# Patient Record
Sex: FEMALE | Race: WHITE | NOT HISPANIC OR LATINO | Employment: OTHER | URBAN - METROPOLITAN AREA
[De-identification: names, ages, dates, MRNs, and addresses within clinical notes are randomized per-mention and may not be internally consistent; named-entity substitution may affect disease eponyms.]

---

## 2017-04-11 ENCOUNTER — OFFICE VISIT (OUTPATIENT)
Dept: NEUROSURGERY | Facility: CLINIC | Age: 72
End: 2017-04-11

## 2017-04-11 VITALS
HEART RATE: 55 BPM | WEIGHT: 120 LBS | DIASTOLIC BLOOD PRESSURE: 75 MMHG | SYSTOLIC BLOOD PRESSURE: 118 MMHG | BODY MASS INDEX: 21.26 KG/M2 | HEIGHT: 63 IN

## 2017-04-11 DIAGNOSIS — M51.17 INTERVERTEBRAL DISC DISORDER WITH RADICULOPATHY OF LUMBOSACRAL REGION: Primary | ICD-10-CM

## 2017-04-11 PROCEDURE — 99213 OFFICE O/P EST LOW 20 MIN: CPT | Performed by: NURSE PRACTITIONER

## 2017-04-11 NOTE — PROGRESS NOTES
Subjective   Patient ID: Aleshia Purvis is a 72 y.o. female is here today for follow-up on back pain after having 3 UYEN's. She states that the first one helped significantly and the last two didn't help as much. She states that her back pain is gradually worsening. She is currently taking Ibuprofen 200 mg PRN for pain. Pain became to recur. She received two more lumbar UYEN in September and November of last year. Pain returned again 4 weeks ago. The pain is in the low back, left buttock and lateral thigh calf. Patient is having more numbness in the left heel, foot and toes.    Back Pain   This is a chronic problem. The current episode started more than 1 year ago. The problem occurs constantly. The problem has been gradually worsening since onset. The pain is present in the lumbar spine. The quality of the pain is described as aching and stabbing. The pain radiates to the left knee, left thigh, right knee and right thigh (right and left ankle). The pain is at a severity of 5/10. The pain is moderate. The pain is worse during the night. The symptoms are aggravated by lying down and bending (walking). Associated symptoms include leg pain (intermittent bilateral leg pain) and numbness (left foot). Pertinent negatives include no bladder incontinence, bowel incontinence or weakness. She has tried NSAIDs (UYEN) for the symptoms. Improvement on treatment: significant relief from 1st UYEN but not last two.       The following portions of the patient's history were reviewed and updated as appropriate: allergies, current medications, past family history, past medical history, past social history, past surgical history and problem list.    Review of Systems   Constitutional: Positive for activity change.   Gastrointestinal: Negative for bowel incontinence.   Genitourinary: Negative for bladder incontinence.   Musculoskeletal: Positive for arthralgias and back pain.   Neurological: Positive for numbness (left foot). Negative for  weakness.   All other systems reviewed and are negative.      Objective   Physical Exam   Constitutional: She is oriented to person, place, and time. She appears well-developed and well-nourished. She is cooperative. No distress.   HENT:   Head: Normocephalic and atraumatic.   Eyes: Conjunctivae are normal.   Neck: Normal range of motion. Neck supple.   Cardiovascular: Normal rate.    Pulmonary/Chest: Effort normal. No respiratory distress.   Abdominal: Soft. She exhibits no distension. There is no tenderness.   Musculoskeletal: Normal range of motion. She exhibits no edema.   Neurological: She is alert and oriented to person, place, and time. She has normal strength. She displays normal reflexes. No sensory deficit. She exhibits normal muscle tone. Gait normal. Coordination and gait normal. GCS eye subscore is 4. GCS verbal subscore is 5. GCS motor subscore is 6.   Reflex Scores:       Tricep reflexes are 2+ on the right side and 2+ on the left side.       Bicep reflexes are 2+ on the right side and 2+ on the left side.       Brachioradialis reflexes are 2+ on the right side and 2+ on the left side.       Patellar reflexes are 2+ on the right side and 2+ on the left side.       Achilles reflexes are 2+ on the right side and 2+ on the left side.  Negative Latham's; negative clonus   Skin: Skin is warm and dry. No rash noted. She is not diaphoretic.   Psychiatric: She has a normal mood and affect. Her speech is normal. Thought content normal.   Vitals reviewed.    Neurologic Exam     Mental Status   Oriented to person, place, and time.   Speech: speech is normal   Level of consciousness: alert    Motor Exam   Muscle bulk: normal  Overall muscle tone: normal    Strength   Strength 5/5 throughout.     Sensory Exam   Light touch normal.     Gait, Coordination, and Reflexes     Gait  Gait: normal    Reflexes   Right brachioradialis: 2+  Left brachioradialis: 2+  Right biceps: 2+  Left biceps: 2+  Right triceps: 2+  Left  triceps: 2+  Right patellar: 2+  Left patellar: 2+  Right achilles: 2+  Left achilles: 2+  Right Latham: absent  Left Latham: absent  Right ankle clonus: absent  Left ankle clonus: absent       Able to bear weight on heels and toes bilaterally.  Straight leg raise positive on the left at 30°.       Assessment/Plan   Independent Review of Radiographic Studies:      Reviewed the previous lumbar MRI report from April 2016 today in the office.  That MRI revealed to left L5-S1 disc herniations.    Medical Decision Making:      Ms. Purvis returns to the office for the above complaints.  She is still quite active in caring for her debilitated .  She has not been seen in the office since August of last year.  She has done well with epidural steroid injections despite the known left L5-S1 disc herniations.    Ms. Purvis did quite well with a lumbar epidural injections.  Her left-sided low back and left leg pain recurred approximately one to 2 months ago.  She would like to try the lumbar epidural injections again and I think that is reasonable.    If the epidurals do not give any relief, Mr. Purvis will call the office.  She will be seen as needed from here.    Aleshia was seen today for back pain.    Diagnoses and all orders for this visit:    Intervertebral disc disorder with radiculopathy of lumbosacral region  -     Epidural Block      Return if symptoms worsen or fail to improve.

## 2017-04-27 ENCOUNTER — ANESTHESIA EVENT (OUTPATIENT)
Dept: PAIN MEDICINE | Facility: HOSPITAL | Age: 72
End: 2017-04-27

## 2017-04-27 ENCOUNTER — ANESTHESIA (OUTPATIENT)
Dept: PAIN MEDICINE | Facility: HOSPITAL | Age: 72
End: 2017-04-27

## 2017-04-27 ENCOUNTER — HOSPITAL ENCOUNTER (OUTPATIENT)
Dept: GENERAL RADIOLOGY | Facility: HOSPITAL | Age: 72
Discharge: HOME | End: 2017-04-27

## 2017-04-27 ENCOUNTER — HOSPITAL ENCOUNTER (OUTPATIENT)
Dept: PAIN MEDICINE | Facility: HOSPITAL | Age: 72
Discharge: HOME | End: 2017-04-27
Admitting: NURSE PRACTITIONER

## 2017-04-27 VITALS
SYSTOLIC BLOOD PRESSURE: 134 MMHG | OXYGEN SATURATION: 97 % | TEMPERATURE: 98.4 F | DIASTOLIC BLOOD PRESSURE: 65 MMHG | HEART RATE: 49 BPM | RESPIRATION RATE: 16 BRPM

## 2017-04-27 DIAGNOSIS — R52 PAIN: ICD-10-CM

## 2017-04-27 DIAGNOSIS — M51.17 INTERVERTEBRAL DISC DISORDER WITH RADICULOPATHY OF LUMBOSACRAL REGION: ICD-10-CM

## 2017-04-27 PROCEDURE — 0 IOPAMIDOL 41 % SOLUTION: Performed by: ANESTHESIOLOGY

## 2017-04-27 PROCEDURE — 25010000002 METHYLPREDNISOLONE PER 80 MG: Performed by: ANESTHESIOLOGY

## 2017-04-27 PROCEDURE — C1755 CATHETER, INTRASPINAL: HCPCS

## 2017-04-27 PROCEDURE — 77003 FLUOROGUIDE FOR SPINE INJECT: CPT

## 2017-04-27 RX ORDER — METHYLPREDNISOLONE ACETATE 80 MG/ML
80 INJECTION, SUSPENSION INTRA-ARTICULAR; INTRALESIONAL; INTRAMUSCULAR; SOFT TISSUE ONCE
Status: COMPLETED | OUTPATIENT
Start: 2017-04-27 | End: 2017-04-27

## 2017-04-27 RX ORDER — LIDOCAINE HYDROCHLORIDE 10 MG/ML
1 INJECTION, SOLUTION INFILTRATION; PERINEURAL ONCE
Status: DISCONTINUED | OUTPATIENT
Start: 2017-04-27 | End: 2017-04-28 | Stop reason: HOSPADM

## 2017-04-27 RX ORDER — MIDAZOLAM HYDROCHLORIDE 1 MG/ML
1 INJECTION INTRAMUSCULAR; INTRAVENOUS
Status: DISCONTINUED | OUTPATIENT
Start: 2017-04-27 | End: 2017-04-28 | Stop reason: HOSPADM

## 2017-04-27 RX ORDER — FENTANYL CITRATE 50 UG/ML
50 INJECTION, SOLUTION INTRAMUSCULAR; INTRAVENOUS
Status: DISCONTINUED | OUTPATIENT
Start: 2017-04-27 | End: 2017-04-28 | Stop reason: HOSPADM

## 2017-04-27 RX ORDER — SODIUM CHLORIDE 0.9 % (FLUSH) 0.9 %
1-10 SYRINGE (ML) INJECTION AS NEEDED
Status: DISCONTINUED | OUTPATIENT
Start: 2017-04-27 | End: 2017-04-28 | Stop reason: HOSPADM

## 2017-04-27 RX ADMIN — METHYLPREDNISOLONE ACETATE 80 MG: 80 INJECTION, SUSPENSION INTRA-ARTICULAR; INTRALESIONAL; INTRAMUSCULAR; SOFT TISSUE at 13:31

## 2017-04-27 RX ADMIN — IOPAMIDOL 10 ML: 408 INJECTION, SOLUTION INTRATHECAL at 13:30

## 2017-04-27 NOTE — H&P
CHIEF COMPLAINT: Back and left lower extremity pain      HISTORY OF PRESENT ILLNESS:  She is most recently in our clinic in November 2016 for an epidural steroid injection.  She received 4-6 months of relief per injection.  This is her fourth injection since April 2016.  She describes low lumbar pain which radiates laterally down the left lower extremity to the foot.  Constant in timing.  Between a 4 and 6 out of 10 on the pain scale.  Described as tingling cramping aching stabbing.  Aggravated by activity.  Helped by physical therapy over-the-counter medications.  Affecting her ADLs sleep and exercise.  Her MRI shows lumbar disc displacement at L5      PAST MEDICAL HISTORY:  No allergies  Medications include aspirin Premarin Advil Prinivil Bystolic  History of atrial fibrillation hypertension    SOCIAL HISTORY:  No tobacco    REVIEW OF SYSTEMS:  No hematologic infectious or constitutional symptoms    PHYSICAL EXAM:  /75 pulse 50 respirations 16 sat 96% temp 36.9  Well-developed well-nourished no acute distress  Extra ocular movements intact  Mallampati class II airway  Cardiac:  Regular rate and rhythm  Lungs:  Clear to auscultation bilaterally with good effort  Alert and oriented ×3  Deep tendon reflexes normal in the bilateral patella  Negative straight leg raise bilaterally  5 out of 5 strength bilateral upper and lower extremities  Lumbar spine without obvious deformities ecchymoses  Lumbar spine nontender to palpation      DIAGNOSIS:  Degeneration of lumbar intervertebral disc  Lumbar Disc Displacement without myelopathy  Lumbar Neuritis      PLAN:  1.  Lumbar epidural steroid injections, up to 3, spaced 1-2 weeks apart.  If pain control is acceptable after 1 or 2 injections, it was discussed with the patient that they may return for the subsequent injections if and when their pain returns.  The risks were discussed with the patient including failure of relief, worsening pain, Headache (post dural  puncture headache), bleeding (epidural hematoma) and infection (epidural abscess or skin infection).  2.  Physical therapy exercises at home as prescribed by physical therapy or from the pain clinic handout (given to the patient).  Continuation of these exercises every day, or multiple times per week, even when the patient has good pain relief, was stressed to the patient as a preventative measure to decrease the frequency and severity of future pain episodes.  3.  Continue pain medicines as already prescribed.  If patient not currently taking any, it is recommended to begin Acetaminophen 1000 mg po q 8 hours.  If other medicines containing Acetaminophen are currently prescribed, maintain daily dose at 3000 mg.    4.  If they can tolerate NSAIDS, it is recommended to take Ibuprofen 600 mg po q 6 hours for 7 days during pain exacerbations.  Alternatively, they may substitute an NSAID of their choice (e.g. Aleve).  This may be taken at the same time as Acetaminophen.  5.  Heat and ice to the affected area as tolerated for pain control.  It was discussed that heating pads can cause burns.  6.  Daily low impact exercise such as walking or water exercise was recommended to maintain overall health and aid in weight control.   7.  Follow up as needed for subsequent injections.  8.  Patient was counseled to abstain from tobacco products.

## 2017-04-27 NOTE — ANESTHESIA PROCEDURE NOTES
PAIN Epidural block    Patient location during procedure: pain clinic  Indication:procedure for pain  Performed By  Anesthesiologist: TAYLER PLUMMER  Preanesthetic Checklist  Completed: patient identified and risks and benefits discussed  Additional Notes  Diagnosis:     Lumbar neuritis   lumbar disc displacement without myelopathy    Sedation:  None    A lumbar epidural steroid injection with fluoroscopic guidance and an Isovue dye epidurogram was performed.  Under fluoroscopic guidance, the epidural space was identified and accessed, confirmed by loss of resistance to saline followed by injection of Isovue dye, which shows a good epidurogram.  The above medications were injected uneventfully.    Epidural Block Prep:  Pt Position:prone  Sterile Tech:cap, gloves, mask and sterile barrier  Prep:chlorhexidine gluconate and isopropyl alcohol  Monitoring:blood pressure monitoring, continuous pulse oximetry and EKG  Epidural Block Procedure:  Approach:left paramedian  Guidance: fluoroscopy  Location:lumbar  Interspace: L5-S1.  Needle Type:Tuohy  Needle Gauge:20  Aspiration:negative  Medications:  Depomedrol:80  Preservative Free Saline:3mL  Isovue:2mL  Comments:Isovue dye reveals good epidurogram  Post Assessment:  Pt Tolerance:patient tolerated the procedure well with no apparent complications  Complications:no

## 2017-08-31 ENCOUNTER — TRANSCRIBE ORDERS (OUTPATIENT)
Dept: PAIN MEDICINE | Facility: HOSPITAL | Age: 72
End: 2017-08-31

## 2017-08-31 DIAGNOSIS — R52 PAIN: Primary | ICD-10-CM

## 2017-09-11 ENCOUNTER — ANESTHESIA (OUTPATIENT)
Dept: PAIN MEDICINE | Facility: HOSPITAL | Age: 72
End: 2017-09-11

## 2017-09-11 ENCOUNTER — HOSPITAL ENCOUNTER (OUTPATIENT)
Dept: GENERAL RADIOLOGY | Facility: HOSPITAL | Age: 72
Discharge: HOME | End: 2017-09-11

## 2017-09-11 ENCOUNTER — HOSPITAL ENCOUNTER (OUTPATIENT)
Dept: PAIN MEDICINE | Facility: HOSPITAL | Age: 72
Discharge: HOME | End: 2017-09-11
Admitting: NURSE PRACTITIONER

## 2017-09-11 ENCOUNTER — ANESTHESIA EVENT (OUTPATIENT)
Dept: PAIN MEDICINE | Facility: HOSPITAL | Age: 72
End: 2017-09-11

## 2017-09-11 VITALS
RESPIRATION RATE: 16 BRPM | SYSTOLIC BLOOD PRESSURE: 140 MMHG | DIASTOLIC BLOOD PRESSURE: 72 MMHG | HEART RATE: 49 BPM | TEMPERATURE: 98 F | OXYGEN SATURATION: 97 %

## 2017-09-11 DIAGNOSIS — R52 PAIN: ICD-10-CM

## 2017-09-11 PROCEDURE — 25010000002 METHYLPREDNISOLONE PER 80 MG: Performed by: ANESTHESIOLOGY

## 2017-09-11 PROCEDURE — C1755 CATHETER, INTRASPINAL: HCPCS

## 2017-09-11 PROCEDURE — 0 IOPAMIDOL 41 % SOLUTION: Performed by: ANESTHESIOLOGY

## 2017-09-11 PROCEDURE — 77003 FLUOROGUIDE FOR SPINE INJECT: CPT

## 2017-09-11 RX ORDER — MIDAZOLAM HYDROCHLORIDE 1 MG/ML
1 INJECTION INTRAMUSCULAR; INTRAVENOUS
Status: DISCONTINUED | OUTPATIENT
Start: 2017-09-11 | End: 2017-09-12 | Stop reason: HOSPADM

## 2017-09-11 RX ORDER — LIDOCAINE HYDROCHLORIDE 10 MG/ML
1 INJECTION, SOLUTION INFILTRATION; PERINEURAL ONCE
Status: DISCONTINUED | OUTPATIENT
Start: 2017-09-11 | End: 2017-09-12 | Stop reason: HOSPADM

## 2017-09-11 RX ORDER — FENTANYL CITRATE 50 UG/ML
50 INJECTION, SOLUTION INTRAMUSCULAR; INTRAVENOUS
Status: DISCONTINUED | OUTPATIENT
Start: 2017-09-11 | End: 2017-09-12 | Stop reason: HOSPADM

## 2017-09-11 RX ORDER — SODIUM CHLORIDE 0.9 % (FLUSH) 0.9 %
1-10 SYRINGE (ML) INJECTION AS NEEDED
Status: DISCONTINUED | OUTPATIENT
Start: 2017-09-11 | End: 2017-09-12 | Stop reason: HOSPADM

## 2017-09-11 RX ORDER — METHYLPREDNISOLONE ACETATE 80 MG/ML
80 INJECTION, SUSPENSION INTRA-ARTICULAR; INTRALESIONAL; INTRAMUSCULAR; SOFT TISSUE ONCE
Status: COMPLETED | OUTPATIENT
Start: 2017-09-11 | End: 2017-09-11

## 2017-09-11 RX ADMIN — METHYLPREDNISOLONE ACETATE 80 MG: 80 INJECTION, SUSPENSION INTRA-ARTICULAR; INTRALESIONAL; INTRAMUSCULAR; SOFT TISSUE at 13:32

## 2017-09-11 RX ADMIN — IOPAMIDOL 10 ML: 408 INJECTION, SOLUTION INTRATHECAL at 13:32

## 2017-09-11 NOTE — H&P
INTERVAL HISTORY:    The patient returns for another Lumbar epidural steroid injection 2 today.  They have received 100 % improvement x 4 months since their last injection with a pain level of 8 /10 at its worst recently.    Conservative measures tried in the interim   MP - 2  Lungs - clear  CV - rrr    Diagnosis:Lumbar Neuritis, Lumbar Disc Displacement without myelopathy    Plan:  Lumbar epidural steroid injection under fluoroscopic guidance    I have encouraged them to continue:  1.  Physical therapy exercises at home as prescribed by physical therapy or from the pain clinic handout (given to the patient).  Continuation of these exercises every day, or multiple times per week, even when the patient has good pain relief, was stressed to the patient as a preventative measure to decrease the frequency and severity of future pain episodes.  2.  Continue pain medicines as already prescribed.  If patient not currently taking any, it is recommended to begin Acetaminophen 1000 mg po q 8 hours.  If other medicines containing Acetaminophen are currently prescribed, maintain daily dose at 3000mg.    3.  If they can tolerate NSAIDS, it is recommended to take Ibuprofen 600 mg po q 6 hours for 7 days during pain exacerbations.   Alternatively, they may substitute an NSAID of their choice (e.g. Aleve)  4.  Heat and ice to the affected area as tolerated for pain control.  It was discussed that heating pads can cause burns.  5.  Low impact exercise such as walking or water exercise was recommended to maintain overall health and aid in weight control.   6.  Follow up as needed for subsequent injections.  7.  Patient was counseled to abstain from tobacco products.

## 2017-09-11 NOTE — ANESTHESIA PROCEDURE NOTES
PAIN Epidural block    Patient location during procedure: pain clinic  Start Time: 9/11/2017 1:21 PM  Stop Time: 9/11/2017 1:33 PM  Indication:at surgeon's request and procedure for pain  Performed By  Anesthesiologist: RODNEY GARCIA  Preanesthetic Checklist  Completed: patient identified, site marked, surgical consent, pre-op evaluation, timeout performed, IV checked, risks and benefits discussed and monitors and equipment checked  Additional Notes  Dx:  Post-Op Diagnosis Codes:     * Lumbar disc displacement without myelopathy (M51.26)     * Lumbar neuritis (M54.16)  80 mg depomedrol in epid  Prep:  Pt Position:prone (prone)  Sterile Tech:cap, gloves, mask and sterile barrier  Prep:chlorhexidine gluconate and isopropyl alcohol  Monitoring:blood pressure monitoring, EKG and continuous pulse oximetry  Procedure:  Approach:midline  Guidance: fluoroscopy and c arm pa and lat and loss of resistance  Location:lumbar  Level:4-5 (interlaminar)  Needle Type:[employer]  Needle Gauge:20  Aspiration:negative  Medications:  Depomedrol:80 mg  Preservative Free Saline:3mL  Isovue:2mL    Post Assessment:  Post-procedure: bandaid.  Pt Tolerance:patient tolerated the procedure well with no apparent complications  Complications:no

## 2020-04-21 ENCOUNTER — HOSPITAL ENCOUNTER (INPATIENT)
Facility: HOSPITAL | Age: 75
LOS: 2 days | Discharge: HOME HEALTH | End: 2020-04-24
Attending: EMERGENCY MEDICINE | Admitting: HOSPITALIST

## 2020-04-21 ENCOUNTER — APPOINTMENT (OUTPATIENT)
Dept: GENERAL RADIOLOGY | Facility: HOSPITAL | Age: 75
End: 2020-04-21

## 2020-04-21 DIAGNOSIS — Z74.09 IMPAIRED MOBILITY: ICD-10-CM

## 2020-04-21 DIAGNOSIS — S72.142A CLOSED INTERTROCHANTERIC FRACTURE OF HIP, LEFT, INITIAL ENCOUNTER (HCC): Primary | ICD-10-CM
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
## 2020-04-21 PROCEDURE — 99284 EMERGENCY DEPT VISIT MOD MDM: CPT

## 2020-04-21 PROCEDURE — 25010000002 MORPHINE PER 10 MG: Performed by: EMERGENCY MEDICINE

## 2020-04-21 RX ORDER — MORPHINE SULFATE 2 MG/ML
2 INJECTION, SOLUTION INTRAMUSCULAR; INTRAVENOUS ONCE
Status: COMPLETED | OUTPATIENT
Start: 2020-04-21 | End: 2020-04-21

## 2020-04-21 RX ORDER — CYCLOBENZAPRINE HCL 10 MG
10 TABLET ORAL 2 TIMES DAILY PRN
COMMUNITY
End: 2020-04-24 | Stop reason: HOSPADM

## 2020-04-21 RX ORDER — FUROSEMIDE 20 MG/1
20 TABLET ORAL DAILY
COMMUNITY
End: 2020-04-24 | Stop reason: HOSPADM

## 2020-04-21 RX ADMIN — MORPHINE SULFATE 2 MG: 2 INJECTION, SOLUTION INTRAMUSCULAR; INTRAVENOUS at 23:41

## 2020-04-22 ENCOUNTER — APPOINTMENT (OUTPATIENT)
Dept: GENERAL RADIOLOGY | Facility: HOSPITAL | Age: 75
End: 2020-04-22

## 2020-04-22 ENCOUNTER — ANESTHESIA (OUTPATIENT)
Dept: PERIOP | Facility: HOSPITAL | Age: 75
End: 2020-04-22

## 2020-04-22 ENCOUNTER — ANESTHESIA EVENT (OUTPATIENT)
Dept: PERIOP | Facility: HOSPITAL | Age: 75
End: 2020-04-22

## 2020-04-22 PROBLEM — D72.829 LEUKOCYTOSIS: Status: ACTIVE | Noted: 2020-04-22

## 2020-04-22 PROBLEM — E78.5 HLD (HYPERLIPIDEMIA): Status: ACTIVE | Noted: 2020-04-22

## 2020-04-22 PROBLEM — R73.9 HYPERGLYCEMIA: Status: ACTIVE | Noted: 2020-04-22

## 2020-04-22 PROBLEM — I10 HYPERTENSION: Status: ACTIVE | Noted: 2020-04-22

## 2020-04-22 PROBLEM — S72.142A CLOSED INTERTROCHANTERIC FRACTURE OF HIP, LEFT, INITIAL ENCOUNTER (HCC): Status: ACTIVE | Noted: 2020-04-22

## 2020-04-22 PROBLEM — I48.91 ATRIAL FIBRILLATION (HCC): Status: ACTIVE | Noted: 2020-04-22

## 2020-04-22 LAB
ABO GROUP BLD: NORMAL
ALBUMIN SERPL-MCNC: 3.8 G/DL (ref 3.5–5.2)
ALBUMIN/GLOB SERPL: 1.6 G/DL
ALP SERPL-CCNC: 82 U/L (ref 39–117)
ALT SERPL W P-5'-P-CCNC: 13 U/L (ref 1–33)
ANION GAP SERPL CALCULATED.3IONS-SCNC: 11.4 MMOL/L (ref 5–15)
ANION GAP SERPL CALCULATED.3IONS-SCNC: 8.3 MMOL/L (ref 5–15)
APTT PPP: 29.5 SECONDS (ref 22.7–35.4)
AST SERPL-CCNC: 17 U/L (ref 1–32)
BASOPHILS # BLD AUTO: 0.06 10*3/MM3 (ref 0–0.2)
BASOPHILS NFR BLD AUTO: 0.5 % (ref 0–1.5)
BILIRUB SERPL-MCNC: 0.3 MG/DL (ref 0.2–1.2)
BLD GP AB SCN SERPL QL: NEGATIVE
BUN BLD-MCNC: 19 MG/DL (ref 8–23)
BUN BLD-MCNC: 22 MG/DL (ref 8–23)
BUN/CREAT SERPL: 17.9 (ref 7–25)
BUN/CREAT SERPL: 21 (ref 7–25)
CALCIUM SPEC-SCNC: 8.9 MG/DL (ref 8.6–10.5)
CALCIUM SPEC-SCNC: 9.1 MG/DL (ref 8.6–10.5)
CHLORIDE SERPL-SCNC: 103 MMOL/L (ref 98–107)
CHLORIDE SERPL-SCNC: 103 MMOL/L (ref 98–107)
CO2 SERPL-SCNC: 24.6 MMOL/L (ref 22–29)
CO2 SERPL-SCNC: 28.7 MMOL/L (ref 22–29)
CREAT BLD-MCNC: 1.05 MG/DL (ref 0.57–1)
CREAT BLD-MCNC: 1.06 MG/DL (ref 0.57–1)
DEPRECATED RDW RBC AUTO: 43.7 FL (ref 37–54)
DEPRECATED RDW RBC AUTO: 44.5 FL (ref 37–54)
EOSINOPHIL # BLD AUTO: 0.08 10*3/MM3 (ref 0–0.4)
EOSINOPHIL NFR BLD AUTO: 0.7 % (ref 0.3–6.2)
ERYTHROCYTE [DISTWIDTH] IN BLOOD BY AUTOMATED COUNT: 12.8 % (ref 12.3–15.4)
ERYTHROCYTE [DISTWIDTH] IN BLOOD BY AUTOMATED COUNT: 13 % (ref 12.3–15.4)
GFR SERPL CREATININE-BSD FRML MDRD: 51 ML/MIN/1.73
GFR SERPL CREATININE-BSD FRML MDRD: 51 ML/MIN/1.73
GLOBULIN UR ELPH-MCNC: 2.4 GM/DL
GLUCOSE BLD-MCNC: 121 MG/DL (ref 65–99)
GLUCOSE BLD-MCNC: 127 MG/DL (ref 65–99)
HCT VFR BLD AUTO: 38.3 % (ref 34–46.6)
HCT VFR BLD AUTO: 38.7 % (ref 34–46.6)
HGB BLD-MCNC: 12.8 G/DL (ref 12–15.9)
HGB BLD-MCNC: 12.9 G/DL (ref 12–15.9)
IMM GRANULOCYTES # BLD AUTO: 0.11 10*3/MM3 (ref 0–0.05)
IMM GRANULOCYTES NFR BLD AUTO: 1 % (ref 0–0.5)
INR PPP: 1 (ref 0.9–1.1)
LYMPHOCYTES # BLD AUTO: 0.98 10*3/MM3 (ref 0.7–3.1)
LYMPHOCYTES NFR BLD AUTO: 8.9 % (ref 19.6–45.3)
MCH RBC QN AUTO: 30.9 PG (ref 26.6–33)
MCH RBC QN AUTO: 31.2 PG (ref 26.6–33)
MCHC RBC AUTO-ENTMCNC: 33.1 G/DL (ref 31.5–35.7)
MCHC RBC AUTO-ENTMCNC: 33.7 G/DL (ref 31.5–35.7)
MCV RBC AUTO: 92.7 FL (ref 79–97)
MCV RBC AUTO: 93.5 FL (ref 79–97)
MONOCYTES # BLD AUTO: 0.84 10*3/MM3 (ref 0.1–0.9)
MONOCYTES NFR BLD AUTO: 7.6 % (ref 5–12)
NEUTROPHILS # BLD AUTO: 8.98 10*3/MM3 (ref 1.7–7)
NEUTROPHILS NFR BLD AUTO: 81.3 % (ref 42.7–76)
NRBC BLD AUTO-RTO: 0 /100 WBC (ref 0–0.2)
PLATELET # BLD AUTO: 199 10*3/MM3 (ref 140–450)
PLATELET # BLD AUTO: 216 10*3/MM3 (ref 140–450)
PMV BLD AUTO: 10.2 FL (ref 6–12)
PMV BLD AUTO: 10.3 FL (ref 6–12)
POTASSIUM BLD-SCNC: 5 MMOL/L (ref 3.5–5.2)
POTASSIUM BLD-SCNC: 5 MMOL/L (ref 3.5–5.2)
PROT SERPL-MCNC: 6.2 G/DL (ref 6–8.5)
PROTHROMBIN TIME: 12.9 SECONDS (ref 11.7–14.2)
RBC # BLD AUTO: 4.13 10*6/MM3 (ref 3.77–5.28)
RBC # BLD AUTO: 4.14 10*6/MM3 (ref 3.77–5.28)
RH BLD: POSITIVE
SODIUM BLD-SCNC: 139 MMOL/L (ref 136–145)
SODIUM BLD-SCNC: 140 MMOL/L (ref 136–145)
T&S EXPIRATION DATE: NORMAL
WBC NRBC COR # BLD: 11.05 10*3/MM3 (ref 3.4–10.8)
WBC NRBC COR # BLD: 12.04 10*3/MM3 (ref 3.4–10.8)

## 2020-04-22 PROCEDURE — C1713 ANCHOR/SCREW BN/BN,TIS/BN: HCPCS | Performed by: ORTHOPAEDIC SURGERY

## 2020-04-22 PROCEDURE — 93005 ELECTROCARDIOGRAM TRACING: CPT | Performed by: PHYSICIAN ASSISTANT

## 2020-04-22 PROCEDURE — 73501 X-RAY EXAM HIP UNI 1 VIEW: CPT

## 2020-04-22 PROCEDURE — 63710000001 ONDANSETRON PER 8 MG: Performed by: ORTHOPAEDIC SURGERY

## 2020-04-22 PROCEDURE — 25010000002 FENTANYL CITRATE (PF) 100 MCG/2ML SOLUTION: Performed by: NURSE ANESTHETIST, CERTIFIED REGISTERED

## 2020-04-22 PROCEDURE — 25010000003 CEFAZOLIN IN DEXTROSE 2-4 GM/100ML-% SOLUTION: Performed by: ORTHOPAEDIC SURGERY

## 2020-04-22 PROCEDURE — 25010000002 DEXAMETHASONE PER 1 MG: Performed by: NURSE ANESTHETIST, CERTIFIED REGISTERED

## 2020-04-22 PROCEDURE — 25010000002 ONDANSETRON PER 1 MG: Performed by: NURSE ANESTHETIST, CERTIFIED REGISTERED

## 2020-04-22 PROCEDURE — 85730 THROMBOPLASTIN TIME PARTIAL: CPT | Performed by: PHYSICIAN ASSISTANT

## 2020-04-22 PROCEDURE — 85027 COMPLETE CBC AUTOMATED: CPT | Performed by: NURSE PRACTITIONER

## 2020-04-22 PROCEDURE — 76000 FLUOROSCOPY <1 HR PHYS/QHP: CPT

## 2020-04-22 PROCEDURE — 73502 X-RAY EXAM HIP UNI 2-3 VIEWS: CPT

## 2020-04-22 PROCEDURE — 93010 ELECTROCARDIOGRAM REPORT: CPT | Performed by: INTERNAL MEDICINE

## 2020-04-22 PROCEDURE — 25010000002 PROPOFOL 10 MG/ML EMULSION: Performed by: NURSE ANESTHETIST, CERTIFIED REGISTERED

## 2020-04-22 PROCEDURE — 25010000002 MORPHINE PER 10 MG: Performed by: EMERGENCY MEDICINE

## 2020-04-22 PROCEDURE — 86850 RBC ANTIBODY SCREEN: CPT | Performed by: PHYSICIAN ASSISTANT

## 2020-04-22 PROCEDURE — 25010000002 HYDROMORPHONE PER 4 MG: Performed by: NURSE ANESTHETIST, CERTIFIED REGISTERED

## 2020-04-22 PROCEDURE — 71045 X-RAY EXAM CHEST 1 VIEW: CPT

## 2020-04-22 PROCEDURE — 85025 COMPLETE CBC W/AUTO DIFF WBC: CPT | Performed by: PHYSICIAN ASSISTANT

## 2020-04-22 PROCEDURE — 85610 PROTHROMBIN TIME: CPT | Performed by: PHYSICIAN ASSISTANT

## 2020-04-22 PROCEDURE — 86901 BLOOD TYPING SEROLOGIC RH(D): CPT | Performed by: PHYSICIAN ASSISTANT

## 2020-04-22 PROCEDURE — 25010000002 FENTANYL CITRATE (PF) 100 MCG/2ML SOLUTION: Performed by: ANESTHESIOLOGY

## 2020-04-22 PROCEDURE — 0QS706Z REPOSITION LEFT UPPER FEMUR WITH INTRAMEDULLARY INTERNAL FIXATION DEVICE, OPEN APPROACH: ICD-10-PCS | Performed by: ORTHOPAEDIC SURGERY

## 2020-04-22 PROCEDURE — 80053 COMPREHEN METABOLIC PANEL: CPT | Performed by: PHYSICIAN ASSISTANT

## 2020-04-22 PROCEDURE — 25010000002 MORPHINE PER 10 MG: Performed by: INTERNAL MEDICINE

## 2020-04-22 PROCEDURE — 86900 BLOOD TYPING SEROLOGIC ABO: CPT | Performed by: PHYSICIAN ASSISTANT

## 2020-04-22 DEVICE — TROCHANTERIC NAIL KIT, TI
Type: IMPLANTABLE DEVICE | Site: FEMUR | Status: FUNCTIONAL
Brand: GAMMA

## 2020-04-22 DEVICE — LOCKING SCREW, FULLY THREADED: Type: IMPLANTABLE DEVICE | Site: FEMUR | Status: FUNCTIONAL

## 2020-04-22 DEVICE — LAG SCREW, TI
Type: IMPLANTABLE DEVICE | Site: FEMUR | Status: FUNCTIONAL
Brand: GAMMA

## 2020-04-22 RX ORDER — SODIUM CHLORIDE, SODIUM LACTATE, POTASSIUM CHLORIDE, CALCIUM CHLORIDE 600; 310; 30; 20 MG/100ML; MG/100ML; MG/100ML; MG/100ML
100 INJECTION, SOLUTION INTRAVENOUS CONTINUOUS
Status: DISCONTINUED | OUTPATIENT
Start: 2020-04-22 | End: 2020-04-23

## 2020-04-22 RX ORDER — ONDANSETRON 4 MG/1
4 TABLET, FILM COATED ORAL EVERY 6 HOURS PRN
Status: DISCONTINUED | OUTPATIENT
Start: 2020-04-22 | End: 2020-04-24 | Stop reason: HOSPADM

## 2020-04-22 RX ORDER — MORPHINE SULFATE 2 MG/ML
2 INJECTION, SOLUTION INTRAMUSCULAR; INTRAVENOUS
Status: DISCONTINUED | OUTPATIENT
Start: 2020-04-22 | End: 2020-04-24 | Stop reason: HOSPADM

## 2020-04-22 RX ORDER — ACETAMINOPHEN 325 MG/1
650 TABLET ORAL EVERY 4 HOURS PRN
Status: DISCONTINUED | OUTPATIENT
Start: 2020-04-22 | End: 2020-04-24 | Stop reason: HOSPADM

## 2020-04-22 RX ORDER — PROMETHAZINE HYDROCHLORIDE 25 MG/1
25 SUPPOSITORY RECTAL ONCE AS NEEDED
Status: DISCONTINUED | OUTPATIENT
Start: 2020-04-22 | End: 2020-04-22 | Stop reason: HOSPADM

## 2020-04-22 RX ORDER — FENTANYL CITRATE 50 UG/ML
50 INJECTION, SOLUTION INTRAMUSCULAR; INTRAVENOUS
Status: DISCONTINUED | OUTPATIENT
Start: 2020-04-22 | End: 2020-04-22 | Stop reason: HOSPADM

## 2020-04-22 RX ORDER — PROMETHAZINE HYDROCHLORIDE 25 MG/ML
12.5 INJECTION, SOLUTION INTRAMUSCULAR; INTRAVENOUS ONCE AS NEEDED
Status: DISCONTINUED | OUTPATIENT
Start: 2020-04-22 | End: 2020-04-22 | Stop reason: HOSPADM

## 2020-04-22 RX ORDER — MORPHINE SULFATE 2 MG/ML
2 INJECTION, SOLUTION INTRAMUSCULAR; INTRAVENOUS ONCE
Status: COMPLETED | OUTPATIENT
Start: 2020-04-22 | End: 2020-04-22

## 2020-04-22 RX ORDER — HYDRALAZINE HYDROCHLORIDE 20 MG/ML
5 INJECTION INTRAMUSCULAR; INTRAVENOUS
Status: DISCONTINUED | OUTPATIENT
Start: 2020-04-22 | End: 2020-04-22 | Stop reason: HOSPADM

## 2020-04-22 RX ORDER — HYDROCODONE BITARTRATE AND ACETAMINOPHEN 5; 325 MG/1; MG/1
2 TABLET ORAL EVERY 4 HOURS PRN
Status: DISCONTINUED | OUTPATIENT
Start: 2020-04-22 | End: 2020-04-24 | Stop reason: HOSPADM

## 2020-04-22 RX ORDER — ROCURONIUM BROMIDE 10 MG/ML
INJECTION, SOLUTION INTRAVENOUS AS NEEDED
Status: DISCONTINUED | OUTPATIENT
Start: 2020-04-22 | End: 2020-04-22 | Stop reason: SURG

## 2020-04-22 RX ORDER — SODIUM CHLORIDE 9 MG/ML
75 INJECTION, SOLUTION INTRAVENOUS CONTINUOUS
Status: DISCONTINUED | OUTPATIENT
Start: 2020-04-22 | End: 2020-04-24 | Stop reason: HOSPADM

## 2020-04-22 RX ORDER — DIPHENHYDRAMINE HYDROCHLORIDE 50 MG/ML
12.5 INJECTION INTRAMUSCULAR; INTRAVENOUS
Status: DISCONTINUED | OUTPATIENT
Start: 2020-04-22 | End: 2020-04-22 | Stop reason: HOSPADM

## 2020-04-22 RX ORDER — HYDROMORPHONE HYDROCHLORIDE 1 MG/ML
0.25 INJECTION, SOLUTION INTRAMUSCULAR; INTRAVENOUS; SUBCUTANEOUS
Status: DISCONTINUED | OUTPATIENT
Start: 2020-04-22 | End: 2020-04-22 | Stop reason: HOSPADM

## 2020-04-22 RX ORDER — HYDROMORPHONE HCL 110MG/55ML
PATIENT CONTROLLED ANALGESIA SYRINGE INTRAVENOUS AS NEEDED
Status: DISCONTINUED | OUTPATIENT
Start: 2020-04-22 | End: 2020-04-22 | Stop reason: SURG

## 2020-04-22 RX ORDER — OXYCODONE AND ACETAMINOPHEN 7.5; 325 MG/1; MG/1
1 TABLET ORAL ONCE AS NEEDED
Status: DISCONTINUED | OUTPATIENT
Start: 2020-04-22 | End: 2020-04-22 | Stop reason: HOSPADM

## 2020-04-22 RX ORDER — FLUMAZENIL 0.1 MG/ML
0.2 INJECTION INTRAVENOUS AS NEEDED
Status: DISCONTINUED | OUTPATIENT
Start: 2020-04-22 | End: 2020-04-22 | Stop reason: HOSPADM

## 2020-04-22 RX ORDER — DEXAMETHASONE SODIUM PHOSPHATE 10 MG/ML
INJECTION INTRAMUSCULAR; INTRAVENOUS AS NEEDED
Status: DISCONTINUED | OUTPATIENT
Start: 2020-04-22 | End: 2020-04-22 | Stop reason: SURG

## 2020-04-22 RX ORDER — ONDANSETRON 2 MG/ML
4 INJECTION INTRAMUSCULAR; INTRAVENOUS ONCE AS NEEDED
Status: COMPLETED | OUTPATIENT
Start: 2020-04-22 | End: 2020-04-22

## 2020-04-22 RX ORDER — SODIUM CHLORIDE, SODIUM LACTATE, POTASSIUM CHLORIDE, CALCIUM CHLORIDE 600; 310; 30; 20 MG/100ML; MG/100ML; MG/100ML; MG/100ML
9 INJECTION, SOLUTION INTRAVENOUS CONTINUOUS
Status: DISCONTINUED | OUTPATIENT
Start: 2020-04-22 | End: 2020-04-22

## 2020-04-22 RX ORDER — LIDOCAINE HYDROCHLORIDE 20 MG/ML
INJECTION, SOLUTION INFILTRATION; PERINEURAL AS NEEDED
Status: DISCONTINUED | OUTPATIENT
Start: 2020-04-22 | End: 2020-04-22 | Stop reason: SURG

## 2020-04-22 RX ORDER — LABETALOL HYDROCHLORIDE 5 MG/ML
5 INJECTION, SOLUTION INTRAVENOUS
Status: DISCONTINUED | OUTPATIENT
Start: 2020-04-22 | End: 2020-04-22 | Stop reason: HOSPADM

## 2020-04-22 RX ORDER — DIPHENHYDRAMINE HCL 25 MG
25 CAPSULE ORAL
Status: DISCONTINUED | OUTPATIENT
Start: 2020-04-22 | End: 2020-04-22 | Stop reason: HOSPADM

## 2020-04-22 RX ORDER — ALUMINA, MAGNESIA, AND SIMETHICONE 2400; 2400; 240 MG/30ML; MG/30ML; MG/30ML
15 SUSPENSION ORAL EVERY 6 HOURS PRN
Status: DISCONTINUED | OUTPATIENT
Start: 2020-04-22 | End: 2020-04-24 | Stop reason: HOSPADM

## 2020-04-22 RX ORDER — NALOXONE HCL 0.4 MG/ML
0.2 VIAL (ML) INJECTION AS NEEDED
Status: DISCONTINUED | OUTPATIENT
Start: 2020-04-22 | End: 2020-04-22 | Stop reason: HOSPADM

## 2020-04-22 RX ORDER — LIDOCAINE HYDROCHLORIDE 10 MG/ML
0.5 INJECTION, SOLUTION EPIDURAL; INFILTRATION; INTRACAUDAL; PERINEURAL ONCE AS NEEDED
Status: DISCONTINUED | OUTPATIENT
Start: 2020-04-22 | End: 2020-04-22 | Stop reason: HOSPADM

## 2020-04-22 RX ORDER — EPHEDRINE SULFATE 50 MG/ML
INJECTION, SOLUTION INTRAVENOUS AS NEEDED
Status: DISCONTINUED | OUTPATIENT
Start: 2020-04-22 | End: 2020-04-22 | Stop reason: SURG

## 2020-04-22 RX ORDER — ONDANSETRON 2 MG/ML
INJECTION INTRAMUSCULAR; INTRAVENOUS AS NEEDED
Status: DISCONTINUED | OUTPATIENT
Start: 2020-04-22 | End: 2020-04-22 | Stop reason: SURG

## 2020-04-22 RX ORDER — EPHEDRINE SULFATE 50 MG/ML
5 INJECTION, SOLUTION INTRAVENOUS ONCE AS NEEDED
Status: DISCONTINUED | OUTPATIENT
Start: 2020-04-22 | End: 2020-04-22 | Stop reason: HOSPADM

## 2020-04-22 RX ORDER — MAGNESIUM HYDROXIDE 1200 MG/15ML
LIQUID ORAL AS NEEDED
Status: DISCONTINUED | OUTPATIENT
Start: 2020-04-22 | End: 2020-04-22 | Stop reason: HOSPADM

## 2020-04-22 RX ORDER — HYDROCODONE BITARTRATE AND ACETAMINOPHEN 7.5; 325 MG/1; MG/1
1 TABLET ORAL ONCE AS NEEDED
Status: DISCONTINUED | OUTPATIENT
Start: 2020-04-22 | End: 2020-04-22 | Stop reason: HOSPADM

## 2020-04-22 RX ORDER — PROMETHAZINE HYDROCHLORIDE 25 MG/ML
6.25 INJECTION, SOLUTION INTRAMUSCULAR; INTRAVENOUS
Status: DISCONTINUED | OUTPATIENT
Start: 2020-04-22 | End: 2020-04-22 | Stop reason: HOSPADM

## 2020-04-22 RX ORDER — PROMETHAZINE HYDROCHLORIDE 25 MG/1
25 TABLET ORAL ONCE AS NEEDED
Status: DISCONTINUED | OUTPATIENT
Start: 2020-04-22 | End: 2020-04-22 | Stop reason: HOSPADM

## 2020-04-22 RX ORDER — SODIUM CHLORIDE 0.9 % (FLUSH) 0.9 %
3 SYRINGE (ML) INJECTION EVERY 12 HOURS SCHEDULED
Status: DISCONTINUED | OUTPATIENT
Start: 2020-04-22 | End: 2020-04-22 | Stop reason: HOSPADM

## 2020-04-22 RX ORDER — ALBUTEROL SULFATE 2.5 MG/3ML
2.5 SOLUTION RESPIRATORY (INHALATION) ONCE AS NEEDED
Status: DISCONTINUED | OUTPATIENT
Start: 2020-04-22 | End: 2020-04-22 | Stop reason: HOSPADM

## 2020-04-22 RX ORDER — HYDROCODONE BITARTRATE AND ACETAMINOPHEN 7.5; 325 MG/1; MG/1
1 TABLET ORAL EVERY 4 HOURS PRN
Status: DISCONTINUED | OUTPATIENT
Start: 2020-04-22 | End: 2020-04-24 | Stop reason: HOSPADM

## 2020-04-22 RX ORDER — BISACODYL 5 MG/1
5 TABLET, DELAYED RELEASE ORAL DAILY PRN
Status: DISCONTINUED | OUTPATIENT
Start: 2020-04-22 | End: 2020-04-24 | Stop reason: HOSPADM

## 2020-04-22 RX ORDER — SODIUM CHLORIDE 0.9 % (FLUSH) 0.9 %
10 SYRINGE (ML) INJECTION AS NEEDED
Status: DISCONTINUED | OUTPATIENT
Start: 2020-04-22 | End: 2020-04-24 | Stop reason: HOSPADM

## 2020-04-22 RX ORDER — PROPOFOL 10 MG/ML
VIAL (ML) INTRAVENOUS AS NEEDED
Status: DISCONTINUED | OUTPATIENT
Start: 2020-04-22 | End: 2020-04-22 | Stop reason: SURG

## 2020-04-22 RX ORDER — ONDANSETRON 2 MG/ML
4 INJECTION INTRAMUSCULAR; INTRAVENOUS EVERY 6 HOURS PRN
Status: DISCONTINUED | OUTPATIENT
Start: 2020-04-22 | End: 2020-04-24 | Stop reason: HOSPADM

## 2020-04-22 RX ORDER — SODIUM CHLORIDE 0.9 % (FLUSH) 0.9 %
3-10 SYRINGE (ML) INJECTION AS NEEDED
Status: DISCONTINUED | OUTPATIENT
Start: 2020-04-22 | End: 2020-04-22 | Stop reason: HOSPADM

## 2020-04-22 RX ORDER — NEBIVOLOL 5 MG/1
5 TABLET ORAL
Status: DISCONTINUED | OUTPATIENT
Start: 2020-04-22 | End: 2020-04-24 | Stop reason: HOSPADM

## 2020-04-22 RX ORDER — ACETAMINOPHEN 325 MG/1
650 TABLET ORAL ONCE AS NEEDED
Status: DISCONTINUED | OUTPATIENT
Start: 2020-04-22 | End: 2020-04-22 | Stop reason: HOSPADM

## 2020-04-22 RX ORDER — FAMOTIDINE 10 MG/ML
20 INJECTION, SOLUTION INTRAVENOUS ONCE
Status: COMPLETED | OUTPATIENT
Start: 2020-04-22 | End: 2020-04-22

## 2020-04-22 RX ORDER — CEFAZOLIN SODIUM 2 G/100ML
2 INJECTION, SOLUTION INTRAVENOUS ONCE
Status: COMPLETED | OUTPATIENT
Start: 2020-04-22 | End: 2020-04-22

## 2020-04-22 RX ORDER — CEFAZOLIN SODIUM 2 G/100ML
2 INJECTION, SOLUTION INTRAVENOUS EVERY 8 HOURS
Status: COMPLETED | OUTPATIENT
Start: 2020-04-22 | End: 2020-04-23

## 2020-04-22 RX ADMIN — EPHEDRINE SULFATE 10 MG: 50 INJECTION INTRAVENOUS at 13:23

## 2020-04-22 RX ADMIN — SODIUM CHLORIDE 75 ML/HR: 9 INJECTION, SOLUTION INTRAVENOUS at 17:53

## 2020-04-22 RX ADMIN — FENTANYL CITRATE 50 MCG: 0.05 INJECTION, SOLUTION INTRAMUSCULAR; INTRAVENOUS at 12:18

## 2020-04-22 RX ADMIN — MORPHINE SULFATE 2 MG: 2 INJECTION, SOLUTION INTRAMUSCULAR; INTRAVENOUS at 03:30

## 2020-04-22 RX ADMIN — ROCURONIUM BROMIDE 50 MG: 10 INJECTION, SOLUTION INTRAVENOUS at 13:15

## 2020-04-22 RX ADMIN — SODIUM CHLORIDE, POTASSIUM CHLORIDE, SODIUM LACTATE AND CALCIUM CHLORIDE 9 ML/HR: 600; 310; 30; 20 INJECTION, SOLUTION INTRAVENOUS at 11:45

## 2020-04-22 RX ADMIN — PROPOFOL 150 MG: 10 INJECTION, EMULSION INTRAVENOUS at 13:15

## 2020-04-22 RX ADMIN — SODIUM CHLORIDE, POTASSIUM CHLORIDE, SODIUM LACTATE AND CALCIUM CHLORIDE: 600; 310; 30; 20 INJECTION, SOLUTION INTRAVENOUS at 14:57

## 2020-04-22 RX ADMIN — CEFAZOLIN SODIUM 2 G: 2 INJECTION, SOLUTION INTRAVENOUS at 13:22

## 2020-04-22 RX ADMIN — HYDROMORPHONE HYDROCHLORIDE 1 MG: 2 INJECTION, SOLUTION INTRAMUSCULAR; INTRAVENOUS; SUBCUTANEOUS at 13:15

## 2020-04-22 RX ADMIN — NEBIVOLOL HYDROCHLORIDE 5 MG: 5 TABLET ORAL at 10:26

## 2020-04-22 RX ADMIN — FENTANYL CITRATE 50 MCG: 0.05 INJECTION, SOLUTION INTRAMUSCULAR; INTRAVENOUS at 16:21

## 2020-04-22 RX ADMIN — FENTANYL CITRATE 50 MCG: 0.05 INJECTION, SOLUTION INTRAMUSCULAR; INTRAVENOUS at 16:56

## 2020-04-22 RX ADMIN — SODIUM CHLORIDE 75 ML/HR: 9 INJECTION, SOLUTION INTRAVENOUS at 03:30

## 2020-04-22 RX ADMIN — LIDOCAINE HYDROCHLORIDE 100 MG: 20 INJECTION, SOLUTION INFILTRATION; PERINEURAL at 13:15

## 2020-04-22 RX ADMIN — SUGAMMADEX 100 MG: 100 INJECTION, SOLUTION INTRAVENOUS at 14:30

## 2020-04-22 RX ADMIN — FAMOTIDINE 20 MG: 10 INJECTION, SOLUTION INTRAVENOUS at 11:45

## 2020-04-22 RX ADMIN — HYDROMORPHONE HYDROCHLORIDE 0.25 MG: 2 INJECTION, SOLUTION INTRAMUSCULAR; INTRAVENOUS; SUBCUTANEOUS at 14:54

## 2020-04-22 RX ADMIN — FENTANYL CITRATE 50 MCG: 0.05 INJECTION, SOLUTION INTRAMUSCULAR; INTRAVENOUS at 11:45

## 2020-04-22 RX ADMIN — ONDANSETRON 4 MG: 2 INJECTION INTRAMUSCULAR; INTRAVENOUS at 15:52

## 2020-04-22 RX ADMIN — HYDROMORPHONE HYDROCHLORIDE 0.25 MG: 2 INJECTION, SOLUTION INTRAMUSCULAR; INTRAVENOUS; SUBCUTANEOUS at 15:00

## 2020-04-22 RX ADMIN — CEFAZOLIN SODIUM 2 G: 2 INJECTION, SOLUTION INTRAVENOUS at 21:02

## 2020-04-22 RX ADMIN — MORPHINE SULFATE 2 MG: 2 INJECTION, SOLUTION INTRAMUSCULAR; INTRAVENOUS at 08:11

## 2020-04-22 RX ADMIN — HYDROCODONE BITARTRATE AND ACETAMINOPHEN 1 TABLET: 7.5; 325 TABLET ORAL at 20:59

## 2020-04-22 RX ADMIN — ONDANSETRON HYDROCHLORIDE 4 MG: 2 SOLUTION INTRAMUSCULAR; INTRAVENOUS at 14:30

## 2020-04-22 RX ADMIN — DEXAMETHASONE SODIUM PHOSPHATE 6 MG: 10 INJECTION INTRAMUSCULAR; INTRAVENOUS at 14:00

## 2020-04-22 RX ADMIN — ONDANSETRON HYDROCHLORIDE 4 MG: 4 TABLET, FILM COATED ORAL at 20:59

## 2020-04-22 RX ADMIN — MORPHINE SULFATE 2 MG: 2 INJECTION, SOLUTION INTRAMUSCULAR; INTRAVENOUS at 00:53

## 2020-04-22 RX ADMIN — EPHEDRINE SULFATE 10 MG: 50 INJECTION INTRAVENOUS at 13:26

## 2020-04-22 NOTE — ED TRIAGE NOTES
Pt to ED from home with c/o left hip pain after fall while ambulating tonight. Pt received 50mcg of Fentanyl and 4mg Zofran en route with EMS. Pt denies hitting head, no LOC, and no blood thinners. Pt arrived with mask applied.

## 2020-04-22 NOTE — OP NOTE
ORTHOPAEDIC OPERATIVE NOTE    Patient: Aleshia Purvis   YOB: 1945    Medical Record Number: [id number]    Attending Physician: Nick Hernandez MD    Primary Care Physician: Maria Elena Hyaward MD    DATE OF PROCEDURE: 4/22/2020    Surgeon: Bela Amaya MD        Pre-op Diagnosis:   Closed intertrochanteric fracture of hip, left, initial encounter (CMS/[facility]) [S72.142A]    Post-Op Diagnosis Codes:  Closed intertrochanteric fracture of hip, left, initial encounter (CMS/[facility]) [S72.142A]    PROCEDURE PERFORMED:  Procedure(s):  INTERNAL FIXATION OF LEFT INTERTROCHANTERIC FEMUR FRACTURE utilizing a [employer]  gamma nail measuring  10 ×170 mm, 125° neck angle, lag screw  95 mm.     Implant Name Type Inv. Item Serial No.  Lot No. LRB No. Used   NAIL FEM LENNOX/170KT TI 125D [id number] STRL - [id number] Implant NAIL FEM LENNOX/170KT TI 125D [id number] STRL  [employer] [id number] Left 1   SCRW LAG GAM3 TI 10.5X95MM STRL - [id number] Implant SCRW LAG GAM3 TI 10.5X95MM STRL  [employer] [id number] Left 1   SCRW LK FT 5X32MM STRL - [id number] Implant SCRW LK FT 5X32MM STRL  [employer] [id number] Left 1       SURGEON: Bela Amaya MD     ASSISTANT: Cedrick Flaherty MD Fellow     ANESTHESIA: General  Anesthesiologist: Jairo Gomez MD  CRNA: Patsy Lackey CRNA; Carley Rees CRNA    Estimated Blood Loss: 50 mL    Specimens: * No orders in the log *    COMPLICATIONS: Nil.     DRAINS: Nil  [REMOVED] External Urinary Catheter (Removed)   Site Assessment Clean;Skin intact 4/22/2020 11:32 AM   Application/Removal external catheter applied 4/22/2020 11:32 AM   Collection Container Wall suction 4/22/2020 11:32 AM   Wall suction (mmHG) 100 mmHG 4/22/2020 11:32 AM   Securement Method Other (Comment) 4/22/2020  4:14 AM       INDICATIONS: The patient is a 75 y.o. female  who presented to  [facility] [place] emergency room following a  Fall from standing height.   The patient sustained an injury to the hip. Evaluation of the x-rays confirm presence of a comminuted intertrochanteric fracture. Treatment options and alternatives were discussed in detail with the  patient and   family  who is indicated for an internal fixation of the  Proximal femur fracture with intramedullary nailing. Likely risks and benefits of the procedure, including but not limited to  infection, DVT, pulmonary embolism, fat embolism, malunion, nonunion, leg length discrepancy, failure of fixation, periimplant fractures, medical risks including stroke, heart attack, have been discussed in detail. Despite the risks involved, the patient elected to proceed and informed consent was obtained and the patient was scheduled for surgery. The patient was seen in the preoperative holding area and the operative site was marked. The patient has been seen and cleared  by the admitting service to the operating room.    DESCRIPTION OF PROCEDURE:   The patient was transferred to [facility] operating room. Preoperative antibiotics in the form of Kefzol  intravenously was infused prior to the incision  according to the SCIP protocol. A surgical time out was done with the team and the correct patient, surgical side and site were identified.      After achieving adequate general anesthesia, the patient was transferred onto the [place] table.  All bony prominences were padded well.  Closed reduction of the hip fracture was done and the position was satisfactory under image intensifier control, both in AP and lateral views.  The  hip was prepped and draped in the usual sterile fashion.  A skin incision about 4 cm long was made proximal to the tip of the greater trochanter.    Skin and subcutaneous  tissue and deep fascia was incised in line with the skin incision.  A threaded guidewire was utilized to enter the proximal femur and the the guidewire was found to be satisfactorily seated in the medullary canal. An  entry reamer was utilized.   There was significant osteopenia.      The trochanteric fracture itself was found to be comminuted.   A   cephalo medullary  nail  measuring 10 × 170 mm was selected, assembled to its introducer on the back table.  The nail was seated over the guidewire.  It was found to be seated satisfactorily.  A second skin incision was made over the lateral aspect of the thigh and a threaded guidewire was passed from the lateral femoral cortex into the femoral neck and femoral head utilizing the locking zig.  This was found to be seated satisfactorily in both AP and lateral views.  Estimated lag screw length was measured, triple reamer was utilized and a lag screw 95 mm was seated into position over the guidewire.  A set screw was seated, making this an angle stable device.      A single interlocking screw was placed from lateral to medial direction utilizing locking zig, in the static hole using a separate small incision.    The position of the hardware, The reduction of the fracture, The fracture alignment and the stability of fixation was found to be satisfactory and stable.  Incisions were thoroughly irrigated with saline and closed in layers utilizing Vicryl  And skin Staples.  Sterile dressings were placed and the patient was transferred to the recovery room in a stable condition.  The patient tolerated the procedure well.  There were no complications.  The patient had adequate distal pulses and good capillary refill.      I plan to continue  antibiotics postoperatively  Kefzol  IV every 8 hours for the first 24 hours.  Also,  will be started on  Lovenox 40 mg subcutaneously daily starting on postoperative day #1.  We will mobilize the patient  with physical therapy.      I discussed the satisfactory performance of the procedure with the patient's family and discussed with them The postoperative management.       Bela Amaya M.D.    4/22/2020    CC: Maria Elena Hayward MD;  MD David Carrasco John B, MD

## 2020-04-22 NOTE — H&P
Patient Name:  Aleshia Purvis  YOB: 1945  MRN:  [id number]  Admit Date:  4/21/2020  Patient Care Team:  Maria Elena Hayward MD as PCP - General (Internal Medicine)      Subjective   History Present Illness     Chief Complaint   Patient presents with   • Fall   • Hip Pain       History of Present Illness   Ms. Purvis is a 75 y.o. non-smoker with a history of atrial fibrillation, HLD, and hypertension that presents to [facility] complaining of left hip pain.   She sustained a mechanical fall last night trying to change into her pajamas.  She fell and hit her left side.  She was brought to the emergency department for further evaluation.  She states that she did not hit her head during the fall.  She denies any anticoagulation use.  An x-ray of her hip performed in the emergency department shows a intertrochanteric left femoral fracture.  Labs obtained in the ED show a glucose of 121, creat 1.05, eGFR 51, and white blood cell count 11.05.  She denies chest pain, shortness of breath, fevers, headache, nausea, vomiting, diarrhea, and constipation.    Review of Systems   Constitutional: Positive for activity change. Negative for chills and fever.   HENT: Negative for congestion and rhinorrhea.    Eyes: Negative for photophobia and visual disturbance.   Respiratory: Negative for cough and shortness of breath.    Cardiovascular: Positive for palpitations. Negative for chest pain.        Occasional palpitations   Gastrointestinal: Negative for constipation, nausea and vomiting.   Endocrine: Negative for cold intolerance and heat intolerance.   Genitourinary: Negative for difficulty urinating and frequency.   Musculoskeletal: Positive for gait problem and myalgias.   Skin: Negative for rash and wound.   Neurological: Negative for dizziness, light-headedness and headaches.   Psychiatric/Behavioral: Negative for sleep disturbance and suicidal ideas.        Personal History     Past Medical  History:   Diagnosis Date   • Arthritis    • Atrial fibrillation (CMS/HCC)    • Cancer (CMS/HCC)     SKIN   • Degeneration of intervertebral disc of lumbar region 4/13/2016   • Hyperlipidemia    • Hypertension      Past Surgical History:   Procedure Laterality Date   • DENTAL PROCEDURE     • HYSTERECTOMY       Family History   Problem Relation Age of Onset   • Cancer Mother    • Emphysema Father      Social History     Tobacco Use   • Smoking status: Never Smoker   • Smokeless tobacco: Never Used   Substance Use Topics   • Alcohol use: No   • Drug use: No     No current facility-administered medications on file prior to encounter.      Current Outpatient Medications on File Prior to Encounter   Medication Sig Dispense Refill   • aspirin 81 MG tablet Take 81 mg by mouth daily.     • Calcium Carb-Cholecalciferol (CALCIUM CARBONATE-VITAMIN D3 PO) Take 1 tablet by mouth daily.     • conjugated estrogens (PREMARIN) vaginal cream START WITH 0.5GM INTO VAGINA VIA APPLICATOR ONCE A DAY FOR 1 WEEK. THEN USE TWICE WEEKLY     • cyclobenzaprine (FLEXERIL) 10 MG tablet Take 10 mg by mouth 2 (Two) Times a Day As Needed for Muscle Spasms.     • furosemide (LASIX) 20 MG tablet Take 20 mg by mouth Daily.     • ibuprofen (ADVIL) 200 MG tablet Take 400 mg by mouth every 6 (six) hours.     • lisinopril (PRINIVIL,ZESTRIL) 10 MG tablet Take 1 tablet by mouth daily.     • nebivolol (BYSTOLIC) 10 MG tablet Take 1 tablet by mouth daily.       No Known Allergies    Objective    Objective     Vital Signs  Temp:  [96.7 °F (35.9 °C)-97.7 °F (36.5 °C)] 97.7 °F (36.5 °C)  Heart Rate:  [60-83] 70  Resp:  [16] 16  BP: (129-152)/(65-87) 129/74  SpO2:  [95 %-98 %] 97 %  on   ;   Device (Oxygen Therapy): room air  Body mass index is 20.55 kg/m².    Physical Exam   Constitutional: She is oriented to person, place, and time. She appears well-developed and well-nourished.  Non-toxic appearance. No distress.   HENT:   Head: Normocephalic and atraumatic.      Eyes: Conjunctivae and EOM are normal. Right eye exhibits no discharge. Left eye exhibits no discharge. No scleral icterus.   Neck: Normal range of motion. Neck supple. No JVD present.   Cardiovascular: Normal rate and regular rhythm.   Murmur heard.  Pulmonary/Chest: Effort normal and breath sounds normal. No respiratory distress. She has no wheezes. She has no rales.   Abdominal: Soft. She exhibits no distension. Bowel sounds are increased. There is no tenderness. There is no guarding.   Musculoskeletal: She exhibits tenderness. She exhibits no deformity.   Limited ROM to left leg   Neurological: She is alert and oriented to person, place, and time.   Skin: Skin is warm and dry. Capillary refill takes less than 2 seconds.   Psychiatric: She has a normal mood and affect. Her behavior is normal.       Results Review:  I reviewed the patient's new clinical results.  Discussed with ED provider.     Lab Results (last 24 hours)     Procedure Component Value Units Date/Time    CBC & Differential [[id number]] Collected:  04/22/20 0048    Specimen:  Blood Updated:  04/22/20 0106    Narrative:       The following orders were created for panel order CBC & Differential.  Procedure                               Abnormality         Status                     ---------                               -----------         ------                     CBC Auto Differential[[id number]]        Abnormal            Final result                 Please view results for these tests on the individual orders.    Comprehensive Metabolic Panel [[id number]]  (Abnormal) Collected:  04/22/20 0048    Specimen:  Blood Updated:  04/22/20 0127     Glucose 121 mg/dL      BUN 22 mg/dL      Creatinine 1.05 mg/dL      Sodium 140 mmol/L      Potassium 5.0 mmol/L      Chloride 103 mmol/L      CO2 28.7 mmol/L      Calcium 9.1 mg/dL      Total Protein 6.2 g/dL      Albumin 3.80 g/dL      ALT (SGPT) 13 U/L      AST (SGOT) 17 U/L      Alkaline Phosphatase 82 U/L       Total Bilirubin 0.3 mg/dL      eGFR Non African Amer 51 mL/min/1.73      Globulin 2.4 gm/dL      A/G Ratio 1.6 g/dL      BUN/Creatinine Ratio 21.0     Anion Gap 8.3 mmol/L     Narrative:       GFR Normal >60  Chronic Kidney Disease <60  Kidney Failure <15      Protime-INR [[id number]]  (Normal) Collected:  04/22/20 0048    Specimen:  Blood Updated:  04/22/20 0116     Protime 12.9 Seconds      INR 1.00    aPTT [[id number]]  (Normal) Collected:  04/22/20 0048    Specimen:  Blood Updated:  04/22/20 0116     PTT 29.5 seconds     CBC Auto Differential [[id number]]  (Abnormal) Collected:  04/22/20 0048    Specimen:  Blood Updated:  04/22/20 0106     WBC 11.05 10*3/mm3      RBC 4.13 10*6/mm3      Hemoglobin 12.9 g/dL      Hematocrit 38.3 %      MCV 92.7 fL      MCH 31.2 pg      MCHC 33.7 g/dL      RDW 12.8 %      RDW-SD 43.7 fl      MPV 10.2 fL      Platelets 216 10*3/mm3      Neutrophil % 81.3 %      Lymphocyte % 8.9 %      Monocyte % 7.6 %      Eosinophil % 0.7 %      Basophil % 0.5 %      Immature Grans % 1.0 %      Neutrophils, Absolute 8.98 10*3/mm3      Lymphocytes, Absolute 0.98 10*3/mm3      Monocytes, Absolute 0.84 10*3/mm3      Eosinophils, Absolute 0.08 10*3/mm3      Basophils, Absolute 0.06 10*3/mm3      Immature Grans, Absolute 0.11 10*3/mm3      nRBC 0.0 /100 WBC           Imaging Results (Last 24 Hours)     Procedure Component Value Units Date/Time    XR Chest 1 View [[id number]] Collected:  04/22/20 0036     Updated:  04/22/20 0040    Narrative:       PORTABLE CHEST RADIOGRAPH     HISTORY: Preoperative examination     COMPARISON: None available.     FINDINGS:  Cardiomegaly is present. There is no vascular congestion. No  pneumothorax or pleural effusion is seen. No acute infiltrates are seen.       Impression:       No acute findings.     This report was finalized on 4/22/2020 12:37 AM by Dr. Evelin Baxter M.D.       XR Hip With or Without Pelvis 2 - 3 View Left [[id number]] Collected:  04/22/20 0032      Updated:  04/22/20 0039    Narrative:       AP VIEW OF THE PELVIS PLUS 2 VIEWS LEFT HIP     HISTORY: Left hip injury     COMPARISON: None available.     FINDINGS:  There is a comminuted intertrochanteric fracture of the left femur. No  additional fractures are seen. Sacroiliac joints appear well-preserved.  No pelvic fractures are seen.       Impression:       Intertrochanteric left femoral fracture.     This report was finalized on 4/22/2020 12:35 AM by Dr. Evelin Baxter M.D.                  ECG 12 Lead   Preliminary Result   HEART RATE= 69  bpm   RR Interval= 804  ms   WA Interval= 181  ms   P Horizontal Axis= -23  deg   P Front Axis= 70  deg   QRSD Interval= 108  ms   QT Interval= 412  ms   QRS Axis= -45  deg   T Wave Axis= 78  deg   - ABNORMAL ECG -   Sinus rhythm   Multiform ventricular premature complexes   LAD, consider left anterior fascicular block   Electronically Signed By:    Date and Time of Study: 2020-04-22 01:05:43           Assessment/Plan     Active Hospital Problems    Diagnosis  POA   • **Closed intertrochanteric fracture of hip, left, initial encounter (CMS/[facility]) [S72.142A]  Yes   • Atrial fibrillation (CMS/[facility]) [I48.91]  Yes   • HLD (hyperlipidemia) [E78.5]  Yes   • Hypertension [I10]  Yes      Resolved Hospital Problems   No resolved problems to display.       Ms. Purvis is a 75 y.o. non-smoker with a history of Atrial fibrillation, HLD, and hypertension who presents with a closed left hip fracture.    -Orthopedic consult  -NPO  -Pain management  -PT to eval and treat after evaluated by orthopedics  -Consider Cardiology clearance, will defer to Orthopedics  -IV hydration overnight  -AM labs  -History of hypertension: will hold antihypertensives until after surgery  -Hx of Atrial fibrillation: No OAC, hear rate controlled       I discussed the patient's findings and my recommendations with patient.    VTE Prophylaxis - SCDs.  Code Status - Full code.       Amanda Trujillo,  EMILY  [facility]  04/22/20  04:26

## 2020-04-22 NOTE — ANESTHESIA PROCEDURE NOTES
Airway  Urgency: elective    Date/Time: 4/22/2020 1:17 PM  Airway not difficult    General Information and Staff    Patient location during procedure: OR  CRNA: Carley Rees CRNA    Indications and Patient Condition  Indications for airway management: airway protection    Preoxygenated: yes  Mask difficulty assessment: 1 - vent by mask    Final Airway Details  Final airway type: endotracheal airway      Successful airway: ETT  Cuffed: yes   Successful intubation technique: direct laryngoscopy  Endotracheal tube insertion site: oral  Blade: Hanna  Blade size: 3  ETT size (mm): 7.0  Cormack-Lehane Classification: grade I - full view of glottis  Placement verified by: chest auscultation and capnometry   Measured from: lips  ETT/EBT  to lips (cm): 21  Number of attempts at approach: 1  Assessment: lips, teeth, and gum same as pre-op and atraumatic intubation    Additional Comments   ett cuff up at MOP

## 2020-04-22 NOTE — CONSULTS
Orthopedic Consult    Patient: Aleshia Purvis                                           YOB: 1945     Date of Admission: 4/21/2020 11:27 PM            Medical Record Number: [id number]    Attending Physician: Nick Hernandez MD    Consulting Physician: Bela Amaya MD    Reason for Consult: LEFT hip fracture    History of Present Illness: 75 y.o. female admitted to [facility] with Fall, initial encounter [W19.XXXA]  Closed intertrochanteric fracture of hip, left, initial encounter (CMS/[facility]) [S72.142A].     The patient was evaluated in the emergency room and was diagnosed with a  hip fracture.   Secondary to the age and multiple medical co morbidities the patient was admitted to the hospitalist.   As I was on call for the emergency room I was consulted for further evaluation and treatment.     The patient was in the usual state of health and fell from standing height in her home trying to change into her pyjamas, Resulting in sudden onset hip pain and inability to ambulate.   Denies any history of loss of consciousness, headache, vomiting, or seizures.   Denies any other injuries.   The patient is not accompanied by her family members  to this hospital visit, due to Covid 19.     The patient denies any prior  pre-existing pain in the hip.  Patient  is a community ambulator. Patient denies  walker/cane assistive device.   The patient  lives at home alone , is quite active and independent in activities of daily living.  The patient denies history of dementia.    Patient denies any history of: DVT/PE, MRSA, COPD, CHF, CAD, Diabetes mellitus, Dementia or    The patient has history of : A-Fib.  The patient is on anticoagulants:     Past medical history, past surgical history, social history, family history, ALLERGIES, current medications have been reviewed by me.    Past Medical History:   Diagnosis Date   • Arthritis    • Atrial fibrillation (CMS/[facility])    • Cancer (CMS/[facility])     SKIN    • Degeneration of intervertebral disc of lumbar region 4/13/2016   • Hyperlipidemia    • Hypertension      Past Surgical History:   Procedure Laterality Date   • DENTAL PROCEDURE     • HYSTERECTOMY       Social History     Occupational History   • Not on file   Tobacco Use   • Smoking status: Never Smoker   • Smokeless tobacco: Never Used   Substance and Sexual Activity   • Alcohol use: No   • Drug use: No   • Sexual activity: Defer    Social History     Social History Narrative   • Not on file     Family History   Problem Relation Age of Onset   • Cancer Mother    • Emphysema Father         No Known Allergies    Home Medications:  Medications Prior to Admission   Medication Sig Dispense Refill Last Dose   • aspirin 81 MG tablet Take 81 mg by mouth daily.   Not Taking   • Calcium Carb-Cholecalciferol (CALCIUM CARBONATE-VITAMIN D3 PO) Take 1 tablet by mouth daily.   Taking   • conjugated estrogens (PREMARIN) vaginal cream START WITH 0.5GM INTO VAGINA VIA APPLICATOR ONCE A DAY FOR 1 WEEK. THEN USE TWICE WEEKLY   Taking   • cyclobenzaprine (FLEXERIL) 10 MG tablet Take 10 mg by mouth 2 (Two) Times a Day As Needed for Muscle Spasms.      • furosemide (LASIX) 20 MG tablet Take 20 mg by mouth Daily.      • ibuprofen (ADVIL) 200 MG tablet Take 400 mg by mouth every 6 (six) hours.   Taking   • lisinopril (PRINIVIL,ZESTRIL) 10 MG tablet Take 1 tablet by mouth daily.   Taking   • nebivolol (BYSTOLIC) 10 MG tablet Take 1 tablet by mouth daily.   Taking       Current Medications:  Scheduled Meds:   Continuous Infusions:  sodium chloride 75 mL/hr Last Rate: 75 mL/hr (04/22/20 0330)     PRN Meds:.•  acetaminophen  •  aluminum-magnesium hydroxide-simethicone  •  bisacodyl  •  Morphine  •  ondansetron **OR** ondansetron  •  sodium chloride    Review of Systems:   A 12 point system review was reviewed with the patient and from the chart  and is negative except as in history of present illness.      Physical Exam: 75 y.o. female                     Vitals:    04/22/20 0055 04/22/20 0130 04/22/20 0336 04/22/20 0500   BP: 135/65 145/67 129/74 142/80   BP Location:   Right arm Right arm   Patient Position:   Lying Lying   Pulse: 83 60 70 73   Resp: 16 16 16 18   Temp:   97.7 °F (36.5 °C) 98.1 °F (36.7 °C)   TempSrc:   Oral Oral   SpO2: 98% 96% 97% 98%   Weight:       Height:            Gait: Could not be tested , patient is nonambulatory.    Mental/HEENT/cardio/skin: The patient's general appearance was well-nourished, well-hydrated, no acute distress.  Orientation was alert and oriented ×3.  The patient's mood was normal.   Pulmonary exam shows normal late exchange, no labored breathing, or shortness of breath.    The skin exam showed normal temperature and color in the area of examination.    Extremities: LEFT   lower extremity positive shortening, positive external rotation, attempted movements of the  hip are painful and restricted. The patient is able to do gentle active range of motion of her toes. Gross sensation is intact over the toes.    Pulses: Pulses palpable and equal bilaterally    Diagnostic Tests:    Results from last 7 days   Lab Units 04/22/20  0545 04/22/20  0048   WBC 10*3/mm3 12.04* 11.05*   HEMOGLOBIN g/dL 12.8 12.9   HEMATOCRIT % 38.7 38.3   PLATELETS 10*3/mm3 199 216     Results from last 7 days   Lab Units 04/22/20  0545 04/22/20  0048   SODIUM mmol/L 139 140   POTASSIUM mmol/L 5.0 5.0   CHLORIDE mmol/L 103 103   CO2 mmol/L 24.6 28.7   BUN mg/dL 19 22   CREATININE mg/dL 1.06* 1.05*   GLUCOSE mg/dL 127* 121*   CALCIUM mg/dL 8.9 9.1     No results found for: URICACID  No results found for: CRYSTAL  Microbiology Results (last 10 days)     ** No results found for the last 240 hours. **        Results from last 7 days   Lab Units 04/22/20  0048   INR  1.00   APTT seconds 29.5     Xr Chest 1 View    Result Date: 4/22/2020  No acute findings.  This report was finalized on 4/22/2020 12:37 AM by Dr. Evelin Baxter M.D.      Xr  Hip With Or Without Pelvis 2 - 3 View Left    Result Date: 4/22/2020  Intertrochanteric left femoral fracture.  This report was finalized on 4/22/2020 12:35 AM by Dr. Evelin Baxter M.D.        The labs, and x-rays for preoperative evaluation have been reviewed by me.     Assessment:  Closed comminuted intertrochanteric LEFT Femur Fracture with displacement.     Patient Active Problem List   Diagnosis   • Degeneration of intervertebral disc of lumbar region   • Stenosis, spinal, lumbar   • Closed intertrochanteric fracture of hip, left, initial encounter (CMS/[facility])   • Atrial fibrillation (CMS/[facility])   • HLD (hyperlipidemia)   • Hypertension       Plan:    Options and alternatives have been discussed in detail with the patient and family members.   The patient is indicated for a left hip open reduction and internal fixation.   The patient/family voiced understanding of the risks, benefits, and alternative forms of treatment that were discussed including but not limited to infection, DVT, pulmonary embolism, malunion, nonunion, leg length discrepancy, possibility of injury to nerves and vessels, periprosthetic fractures have been discussed in detail. Despite the above risks the patient/family consents to proceed with  hip surgical intervention.   The patient is scheduled for the above surgery tentatively for April 22, 2020.   Patient will be made NPO.  Obtain informed consent.   The patient's admitting service has seen the patient and the patient is cleared to the operating room.      Date: 4/22/2020    Bela Amaya MD    CC: Maria Elena Hayward MD; MD David Carrasco John B, MD

## 2020-04-22 NOTE — ANESTHESIA PREPROCEDURE EVALUATION
Anesthesia Evaluation     Patient summary reviewed and Nursing notes reviewed   NPO Solid Status: > 8 hours  NPO Liquid Status: > 8 hours           Airway   Mallampati: II  TM distance: >3 FB  Neck ROM: full  No difficulty expected  Dental    (+) partials    Pulmonary - negative pulmonary ROS and normal exam   Cardiovascular   Exercise tolerance: good (4-7 METS)    ECG reviewed  Rhythm: regular  Rate: normal    (+) hypertension well controlled 2 medications or greater, dysrhythmias PAC, hyperlipidemia,     ROS comment: Echo from 10/219 - Normal LV function, mild to mod MR, mild AI, diastolic dysfunction    Neuro/Psych- negative ROS  GI/Hepatic/Renal/Endo    (+)  GERD well controlled,      Musculoskeletal     Abdominal  - normal exam   Substance History      OB/GYN          Other   arthritis,    history of cancer remission      Other Comment: skin ca                Anesthesia Plan    ASA 3     general     intravenous induction     Anesthetic plan, all risks, benefits, and alternatives have been provided, discussed and informed consent has been obtained with: patient.

## 2020-04-22 NOTE — ED PROVIDER NOTES
EMERGENCY DEPARTMENT ENCOUNTER    Room Number:  07/07  Date of encounter:  4/22/2020  PCP: Maria Elena Hayward MD  Historian: Patient      HPI:  Chief Complaint: Fall with left hip pain      Context: Aleshia Purvis is a 75 y.o. female who presents to the ED c/o mechanical fall with left hip injury that took place at home just prior to arrival.  The patient states she was getting ready for bed and her leg got caught in her blue jeans she lost her balance fell on her left side.  The pain in the left hip is stated to be an 8 out of 10 with slight movement 5 or 6 out of 10 lying still without movement.  It does not radiate.  She denies hitting her head in the fall and states she broke her fall with her left elbow which is nontender at this time.  She denies neck pain, back pain, abdominal pain, and chest wall pain associated with the fall.  She does not take any anticoagulants but does take a baby aspirin for antiplatelet therapy.  She does not have an orthopedist at this time.  Primary care physician is Dr. Maria Elena Hayward.    PAST MEDICAL HISTORY  Active Ambulatory Problems     Diagnosis Date Noted   • Degeneration of intervertebral disc of lumbar region 04/13/2016   • Stenosis, spinal, lumbar 08/08/2016     Resolved Ambulatory Problems     Diagnosis Date Noted   • No Resolved Ambulatory Problems     Past Medical History:   Diagnosis Date   • Arthritis    • Atrial fibrillation (CMS/HCC)    • Cancer (CMS/HCC)    • Hyperlipidemia    • Hypertension          PAST SURGICAL HISTORY  Past Surgical History:   Procedure Laterality Date   • DENTAL PROCEDURE     • HYSTERECTOMY           FAMILY HISTORY  Family History   Problem Relation Age of Onset   • Cancer Mother    • Emphysema Father          SOCIAL HISTORY  Social History     Socioeconomic History   • Marital status:      Spouse name: Not on file   • Number of children: Not on file   • Years of education: Not on file   • Highest education level: Not on file   Tobacco  Use   • Smoking status: Never Smoker   • Smokeless tobacco: Never Used   Substance and Sexual Activity   • Alcohol use: No   • Drug use: No   • Sexual activity: Defer         ALLERGIES  Patient has no known allergies.        REVIEW OF SYSTEMS  Review of Systems   Constitutional: Negative.    HENT: Negative.    Eyes: Negative.    Respiratory: Negative.    Cardiovascular: Negative.  Negative for chest pain and palpitations.   Gastrointestinal: Negative for abdominal pain, nausea and vomiting.   Genitourinary: Negative.  Negative for difficulty urinating.   Musculoskeletal: Negative for back pain.   Skin: Negative.    Neurological: Negative for dizziness, light-headedness and headaches.   Psychiatric/Behavioral: Negative.               PHYSICAL EXAM    I have reviewed the triage vital signs and nursing notes.    ED Triage Vitals   Temp Heart Rate Resp BP SpO2   04/21/20 2330 04/21/20 2327 04/21/20 2327 04/21/20 2327 04/21/20 2327   96.7 °F (35.9 °C) 75 16 152/78 96 %      Temp src Heart Rate Source Patient Position BP Location FiO2 (%)   04/21/20 2330 04/21/20 2327 -- -- --   Tympanic Monitor          Physical Exam  GENERAL: Patient appears to be in moderate distress.  HENT: nares patent, mucous membranes normal  EYES: no scleral icterus, conjunctiva normal  CV: regular rhythm, regular rate, faint systolic murmur present, normal S1-S2.  No pedal edema or JVD noted.  +2 pedal pulses bilaterally.  RESPIRATORY: normal effort, lungs clear to auscultation bilaterally  ABDOMEN: soft, nontender, without bruising or signs of trauma  MUSCULOSKELETAL: Superficial ecchymosis over the left elbow without hematoma or bony tenderness.  Patient is lying on the left side and the left leg appears to be slightly shortened and externally rotated.  NEURO: alert, follows commands, no focal deficits, gross sensation intact bilateral lower extremities.  SKIN: warm, dry, ecchymosis left elbow as described above.        LAB RESULTS  Recent  Results (from the past 24 hour(s))   Comprehensive Metabolic Panel    Collection Time: 04/22/20 12:48 AM   Result Value Ref Range    Glucose 121 (H) 65 - 99 mg/dL    BUN 22 8 - 23 mg/dL    Creatinine 1.05 (H) 0.57 - 1.00 mg/dL    Sodium 140 136 - 145 mmol/L    Potassium 5.0 3.5 - 5.2 mmol/L    Chloride 103 98 - 107 mmol/L    CO2 28.7 22.0 - 29.0 mmol/L    Calcium 9.1 8.6 - 10.5 mg/dL    Total Protein 6.2 6.0 - 8.5 g/dL    Albumin 3.80 3.50 - 5.20 g/dL    ALT (SGPT) 13 1 - 33 U/L    AST (SGOT) 17 1 - 32 U/L    Alkaline Phosphatase 82 39 - 117 U/L    Total Bilirubin 0.3 0.2 - 1.2 mg/dL    eGFR Non African Amer 51 (L) >60 mL/min/1.73    Globulin 2.4 gm/dL    A/G Ratio 1.6 g/dL    BUN/Creatinine Ratio 21.0 7.0 - 25.0    Anion Gap 8.3 5.0 - 15.0 mmol/L   Type & Screen    Collection Time: 04/22/20 12:48 AM   Result Value Ref Range    ABO Type A     RH type Positive     Antibody Screen Negative     T&S Expiration Date 4/25/2020 11:59:59 PM    Protime-INR    Collection Time: 04/22/20 12:48 AM   Result Value Ref Range    Protime 12.9 11.7 - 14.2 Seconds    INR 1.00 0.90 - 1.10   aPTT    Collection Time: 04/22/20 12:48 AM   Result Value Ref Range    PTT 29.5 22.7 - 35.4 seconds   CBC Auto Differential    Collection Time: 04/22/20 12:48 AM   Result Value Ref Range    WBC 11.05 (H) 3.40 - 10.80 10*3/mm3    RBC 4.13 3.77 - 5.28 10*6/mm3    Hemoglobin 12.9 12.0 - 15.9 g/dL    Hematocrit 38.3 34.0 - 46.6 %    MCV 92.7 79.0 - 97.0 fL    MCH 31.2 26.6 - 33.0 pg    MCHC 33.7 31.5 - 35.7 g/dL    RDW 12.8 12.3 - 15.4 %    RDW-SD 43.7 37.0 - 54.0 fl    MPV 10.2 6.0 - 12.0 fL    Platelets 216 140 - 450 10*3/mm3    Neutrophil % 81.3 (H) 42.7 - 76.0 %    Lymphocyte % 8.9 (L) 19.6 - 45.3 %    Monocyte % 7.6 5.0 - 12.0 %    Eosinophil % 0.7 0.3 - 6.2 %    Basophil % 0.5 0.0 - 1.5 %    Immature Grans % 1.0 (H) 0.0 - 0.5 %    Neutrophils, Absolute 8.98 (H) 1.70 - 7.00 10*3/mm3    Lymphocytes, Absolute 0.98 0.70 - 3.10 10*3/mm3    Monocytes,  Absolute 0.84 0.10 - 0.90 10*3/mm3    Eosinophils, Absolute 0.08 0.00 - 0.40 10*3/mm3    Basophils, Absolute 0.06 0.00 - 0.20 10*3/mm3    Immature Grans, Absolute 0.11 (H) 0.00 - 0.05 10*3/mm3    nRBC 0.0 0.0 - 0.2 /100 WBC       Ordered the above labs and independently reviewed the results.        RADIOLOGY  Xr Chest 1 View    Result Date: 4/22/2020  PORTABLE CHEST RADIOGRAPH  HISTORY: Preoperative examination  COMPARISON: None available.  FINDINGS: Cardiomegaly is present. There is no vascular congestion. No pneumothorax or pleural effusion is seen. No acute infiltrates are seen.      No acute findings.  This report was finalized on 4/22/2020 12:37 AM by Dr. Evelin Baxter M.D.      Xr Hip With Or Without Pelvis 2 - 3 View Left    Result Date: 4/22/2020  AP VIEW OF THE PELVIS PLUS 2 VIEWS LEFT HIP  HISTORY: Left hip injury  COMPARISON: None available.  FINDINGS: There is a comminuted intertrochanteric fracture of the left femur. No additional fractures are seen. Sacroiliac joints appear well-preserved. No pelvic fractures are seen.      Intertrochanteric left femoral fracture.  This report was finalized on 4/22/2020 12:35 AM by Dr. Evelin Baxter M.D.        I ordered the above noted radiological studies. Reviewed by me and discussed with radiologist.  See dictation for official radiology interpretation.      PROCEDURES    Procedures      MEDICATIONS GIVEN IN ER    Medications   morphine injection 2 mg (2 mg Intravenous Given 4/21/20 2341)   morphine injection 2 mg (2 mg Intravenous Given 4/22/20 0053)         PROGRESS, DATA ANALYSIS, CONSULTS, AND MEDICAL DECISION MAKING    All labs have been independently reviewed by me.  All radiology studies have been reviewed by me and discussed with radiologist dictating the report.   EKG's independently viewed and interpreted by me.  Discussion below represents my analysis of pertinent findings related to patient's condition, differential diagnosis, treatment plan and  final disposition.    DDx:  Fem neck fx, Intertroch fx, Pubic Rami Fx Acetabulum Fx, Hip contusion, Hip Strain.  Xray confirms intertrochanteric hip fx.    0030:  Discussed case with Dr Lerma my supervising physician.  She agrees with plan to admit patient for definitive care.  Call placed to [facility]    0140:  Discussed with Hans DIAZ.  To admit patient to Dr Valencia's care.       Patient was placed in face mask in first look. Patient was wearing facemask when I entered the room and throughout our encounter. I wore full protective equipment throughout this patient encounter including a face mask, and gloves. Hand hygiene was performed before donning protective equipment and after removal when leaving the room.    AS OF 01:54 VITALS:    BP - 145/67  HR - 60  TEMP - 96.7 °F (35.9 °C) (Tympanic)  O2 SATS - 96%        DIAGNOSIS  Final diagnoses:   Closed intertrochanteric fracture of hip, left, initial encounter (CMS/[facility])   Fall, initial encounter         DISPOSITION  DISCHARGE    Patient discharged in stable condition.    Reviewed implications of results, diagnosis, meds, responsibility to follow up, warning signs and symptoms of possible worsening, potential complications and reasons to return to ER.    Patient/Family voiced understanding of above instructions.    Discussed plan for discharge, as there is no emergent indication for admission. Patient referred to primary care provider for BP management due to today's BP. Pt/family is agreeable and understands need for follow up and repeat testing.  Pt is aware that discharge does not mean that nothing is wrong but it indicates no emergency is present that requires admission and they must continue care with follow-up as given below or physician of their choice.     FOLLOW-UP  No follow-up provider specified.       Medication List      No changes were made to your prescriptions during this visit.                  Abelardo Greenberg III, PA  04/22/20 0151        Abelardo Greenberg III, PA  04/22/20 0154

## 2020-04-22 NOTE — PLAN OF CARE
Problem: Patient Care Overview  Goal: Plan of Care Review  Outcome: Ongoing (interventions implemented as appropriate)  Flowsheets (Taken 4/22/2020 1957)  Plan of Care Reviewed With: patient  Outcome Summary: VSS. IV fluids infusing, SL when adequate PO. Voided. Left hip incision- 3 Mepliex's, one mepliex with dried drainage, ice applied to site. IS up to 2000. Neurovascular checks. Started on Regular diet. Will ambulate later on tonight. Pt consulted. AM labs ordered.

## 2020-04-22 NOTE — ED PROVIDER NOTES
[facility] and I have discussed this patient's history, physical exam, and treatment plan. I have reviewed the documentation and personally had a face to face interaction with the patient  I affirm the documentation and agree with the treatment and plan.  The following describes my personal findings.    The patient presents complaining of left hip pain after losing her balance and falling at home today.  Patient reports she has been able able to weight-bear since the time of her fall.  Patient denies hitting her head or any loss of consciousness.  Patient denies any new neck or back pain.  Patient reports she always has numbness to her left foot chronically and this is unchanged today.    Limited physical exam:  Patient is nontoxic appearing moderate discomfort secondary to pain in her left hip, awake, alert, oriented  Lungs/cardiovascular: Good air movement bilaterally, systolic murmur 3 out of 6, irregular heartbeat non-tachycardic  Abdomen: Soft, nontender  Back/extremities: Pain with minimal range of motion left hip, distal range of motion dorsalis pedis pulse and posterior tibial pulses palpable bilateral lower extremities    Anticipate admission for definitive care of left intertrochanteric hip fracture after review of patient's imaging study in the PACS system      Patient was wearing facemask when I entered the room and throughout our encounter. Full protective equipment was worn throughout this patient encounter including a face mask, eye protection and gloves. Hand hygiene was performed before donning protective equipment and after removal when leaving the room.             Camelia Lerma MD  04/22/20 0224

## 2020-04-22 NOTE — PLAN OF CARE
Problem: Patient Care Overview  Goal: Plan of Care Review  Outcome: Ongoing (interventions implemented as appropriate)  Flowsheets (Taken 4/22/2020 0413)  Progress: improving  Plan of Care Reviewed With: patient  Note:   Admit from ED with L hip fracture. VSS. Purewick in place. Ortho to see. NPO for possible surgery. IV pain meds given. Bed alarm for safety. Will cont to monitor.

## 2020-04-22 NOTE — ANESTHESIA POSTPROCEDURE EVALUATION
"Patient: Aleshia Purvis    Procedure Summary     Date:  04/22/20 Room / Location:  [facility] OR  / [facility] MAIN OR    Anesthesia Start:  1305 Anesthesia Stop:  1514    Procedure:  INTERNAL FIXATION OF LEFT INTERTROCHANTERIC FEMUR FRACTURE (Left Thigh) Diagnosis:       Closed intertrochanteric fracture of hip, left, initial encounter (CMS/[facility])      (Closed intertrochanteric fracture of hip, left, initial encounter (CMS/[facility]) [S72.142A])    Surgeon:  Bela Amaya MD Provider:  Jairo Gomez MD    Anesthesia Type:  general ASA Status:  3          Anesthesia Type: general    Vitals  Vitals Value Taken Time   /84 4/22/2020  3:50 PM   Temp 36.7 °C (98 °F) 4/22/2020  3:09 PM   Pulse 74 4/22/2020  3:57 PM   Resp 16 4/22/2020  3:50 PM   SpO2 98 % 4/22/2020  3:57 PM   Vitals shown include unvalidated device data.        Post Anesthesia Care and Evaluation    Patient location during evaluation: bedside  Patient participation: complete - patient participated  Level of consciousness: awake and alert  Pain score: 0  Pain management: adequate  Airway patency: patent  Anesthetic complications: No anesthetic complications    Cardiovascular status: acceptable  Respiratory status: acceptable  Hydration status: acceptable    Comments: /84   Pulse 93   Temp 36.7 °C (98 °F) (Oral)   Resp 16   Ht 160 cm (63\")   Wt 52.6 kg (116 lb)   SpO2 96%   BMI 20.55 kg/m²       "

## 2020-04-23 PROBLEM — D62 POSTOPERATIVE ANEMIA DUE TO ACUTE BLOOD LOSS: Status: ACTIVE | Noted: 2020-04-23

## 2020-04-23 LAB
ANION GAP SERPL CALCULATED.3IONS-SCNC: 11.9 MMOL/L (ref 5–15)
BASOPHILS # BLD AUTO: 0.01 10*3/MM3 (ref 0–0.2)
BASOPHILS NFR BLD AUTO: 0.1 % (ref 0–1.5)
BUN BLD-MCNC: 16 MG/DL (ref 8–23)
BUN/CREAT SERPL: 18 (ref 7–25)
CALCIUM SPEC-SCNC: 8.6 MG/DL (ref 8.6–10.5)
CHLORIDE SERPL-SCNC: 102 MMOL/L (ref 98–107)
CO2 SERPL-SCNC: 23.1 MMOL/L (ref 22–29)
CREAT BLD-MCNC: 0.89 MG/DL (ref 0.57–1)
DEPRECATED RDW RBC AUTO: 41.4 FL (ref 37–54)
EOSINOPHIL # BLD AUTO: 0 10*3/MM3 (ref 0–0.4)
EOSINOPHIL NFR BLD AUTO: 0 % (ref 0.3–6.2)
ERYTHROCYTE [DISTWIDTH] IN BLOOD BY AUTOMATED COUNT: 12.3 % (ref 12.3–15.4)
GFR SERPL CREATININE-BSD FRML MDRD: 62 ML/MIN/1.73
GLUCOSE BLD-MCNC: 150 MG/DL (ref 65–99)
HBA1C MFR BLD: 5.1 % (ref 4.8–5.6)
HCT VFR BLD AUTO: 29.3 % (ref 34–46.6)
HGB BLD-MCNC: 9.9 G/DL (ref 12–15.9)
IMM GRANULOCYTES # BLD AUTO: 0.07 10*3/MM3 (ref 0–0.05)
IMM GRANULOCYTES NFR BLD AUTO: 0.7 % (ref 0–0.5)
LYMPHOCYTES # BLD AUTO: 0.43 10*3/MM3 (ref 0.7–3.1)
LYMPHOCYTES NFR BLD AUTO: 4.1 % (ref 19.6–45.3)
MCH RBC QN AUTO: 30.9 PG (ref 26.6–33)
MCHC RBC AUTO-ENTMCNC: 33.8 G/DL (ref 31.5–35.7)
MCV RBC AUTO: 91.6 FL (ref 79–97)
MONOCYTES # BLD AUTO: 0.58 10*3/MM3 (ref 0.1–0.9)
MONOCYTES NFR BLD AUTO: 5.6 % (ref 5–12)
NEUTROPHILS # BLD AUTO: 9.31 10*3/MM3 (ref 1.7–7)
NEUTROPHILS NFR BLD AUTO: 89.5 % (ref 42.7–76)
NRBC BLD AUTO-RTO: 0 /100 WBC (ref 0–0.2)
PLATELET # BLD AUTO: 181 10*3/MM3 (ref 140–450)
PMV BLD AUTO: 10.9 FL (ref 6–12)
POTASSIUM BLD-SCNC: 4.8 MMOL/L (ref 3.5–5.2)
RBC # BLD AUTO: 3.2 10*6/MM3 (ref 3.77–5.28)
SODIUM BLD-SCNC: 137 MMOL/L (ref 136–145)
WBC NRBC COR # BLD: 10.4 10*3/MM3 (ref 3.4–10.8)

## 2020-04-23 PROCEDURE — 85025 COMPLETE CBC W/AUTO DIFF WBC: CPT | Performed by: ORTHOPAEDIC SURGERY

## 2020-04-23 PROCEDURE — 25010000002 ONDANSETRON PER 1 MG: Performed by: ORTHOPAEDIC SURGERY

## 2020-04-23 PROCEDURE — 83036 HEMOGLOBIN GLYCOSYLATED A1C: CPT | Performed by: ORTHOPAEDIC SURGERY

## 2020-04-23 PROCEDURE — 97162 PT EVAL MOD COMPLEX 30 MIN: CPT

## 2020-04-23 PROCEDURE — 25010000002 ENOXAPARIN PER 10 MG: Performed by: ORTHOPAEDIC SURGERY

## 2020-04-23 PROCEDURE — 80048 BASIC METABOLIC PNL TOTAL CA: CPT | Performed by: ORTHOPAEDIC SURGERY

## 2020-04-23 PROCEDURE — 97110 THERAPEUTIC EXERCISES: CPT

## 2020-04-23 PROCEDURE — 25010000003 CEFAZOLIN IN DEXTROSE 2-4 GM/100ML-% SOLUTION: Performed by: ORTHOPAEDIC SURGERY

## 2020-04-23 RX ORDER — HYDROCODONE BITARTRATE AND ACETAMINOPHEN 7.5; 325 MG/1; MG/1
1 TABLET ORAL EVERY 4 HOURS PRN
Qty: 42 TABLET | Refills: 0 | Status: SHIPPED | OUTPATIENT
Start: 2020-04-23 | End: 2020-05-02

## 2020-04-23 RX ORDER — POLYETHYLENE GLYCOL 3350 17 G/17G
17 POWDER, FOR SOLUTION ORAL DAILY
Status: DISCONTINUED | OUTPATIENT
Start: 2020-04-23 | End: 2020-04-24 | Stop reason: HOSPADM

## 2020-04-23 RX ADMIN — HYDROCODONE BITARTRATE AND ACETAMINOPHEN 1 TABLET: 7.5; 325 TABLET ORAL at 08:29

## 2020-04-23 RX ADMIN — ONDANSETRON 4 MG: 2 INJECTION INTRAMUSCULAR; INTRAVENOUS at 11:35

## 2020-04-23 RX ADMIN — POLYETHYLENE GLYCOL 3350 17 G: 17 POWDER, FOR SOLUTION ORAL at 13:24

## 2020-04-23 RX ADMIN — NEBIVOLOL HYDROCHLORIDE 5 MG: 5 TABLET ORAL at 08:24

## 2020-04-23 RX ADMIN — ENOXAPARIN SODIUM 40 MG: 40 INJECTION SUBCUTANEOUS at 08:24

## 2020-04-23 RX ADMIN — HYDROCODONE BITARTRATE AND ACETAMINOPHEN 1 TABLET: 7.5; 325 TABLET ORAL at 14:25

## 2020-04-23 RX ADMIN — HYDROCODONE BITARTRATE AND ACETAMINOPHEN 1 TABLET: 7.5; 325 TABLET ORAL at 22:14

## 2020-04-23 RX ADMIN — CEFAZOLIN SODIUM 2 G: 2 INJECTION, SOLUTION INTRAVENOUS at 06:32

## 2020-04-23 NOTE — PROGRESS NOTES
Discharge Planning Assessment  [facility]     Patient Name: Aleshia Purvis  MRN: [id number]  Today's Date: 4/23/2020    Admit Date: 4/21/2020    Discharge Needs Assessment     Row Name 04/23/20 1022       Living Environment    Lives With  alone    Current Living Arrangements  home/apartment/condo    Primary Care Provided by  self    Provides Primary Care For  no one    Family Caregiver if Needed  child(carolyne), adult;sibling(s)    Quality of Family Relationships  helpful;involved;supportive       Resource/Environmental Concerns    Resource/Environmental Concerns  none       Transition Planning    Patient/Family Anticipates Transition to  home    Transportation Anticipated  family or friend will provide       Discharge Needs Assessment    Concerns to be Addressed  denies needs/concerns at this time    Equipment Currently Used at Home  shower chair;grab bar    Provided Post Acute Provider List?  N/A Denies needs. Plan is home        Discharge Plan     Row Name 04/23/20 1023       Plan    Plan  Home    Patient/Family in Agreement with Plan  yes    Plan Comments  IMM noted. Introduced self and role of CCP. Facesheet verified. Emergency contact changed to Maile mcclain [phone](c). Patient lives alone in a one story patio home. There are no steps to enter and no steps inside. Prior to admission, patient was independent with ADL's and continued to drive.  Uses walk-in shower with grab bars and shower chair. Patient stated she has a cane, FW walker and rollator if needed. Stated all her toilets are highraised. Patient has never used a HH agency nor been to rehab. [place] plan is to return home with Maile mcclain to assist.  Daughter is a RN.  Daughter will provide transportation at [place]. Denies any needs/equipment.                Demographic Summary     Row Name 04/23/20 1021       General Information    Admission Type  inpatient    Arrived From  home    Required Notices Provided  Important Message from Medicare     Reason for Consult  discharge planning    Preferred Language  English     Used During This Interaction  no        Functional Status     Row Name 04/23/20 1021       Functional Status    Usual Activity Tolerance  good    Current Activity Tolerance  good       Functional Status, IADL    Medications  independent    Meal Preparation  independent    Housekeeping  independent    Laundry  independent    Shopping  independent       Mental Status    General Appearance WDL  WDL       Employment/    Employment Status  retired        Psychosocial     Row Name 04/23/20 1021       Values/Beliefs    Spiritual, Cultural Beliefs, Scientologist Practices, Values that Affect Care  no       Behavior WDL    Behavior WDL  WDL       Emotion Mood WDL    Emotion/Mood/Affect WDL  WDL       Speech WDL    Speech WDL  WDL       Perceptual State WDL    Perceptual State WDL  WDL       Intellectual Performance WDL    Level of Consciousness  Alert       Coping/Stress    Sources of Support  adult child(carolyne);sibling(s)    Reaction to Health Status  adjusting    Understanding of Condition and Treatment  adequate understanding of treatment;adequate understanding of medical condition       Developmental Stage (Eriksson's)    Developmental Stage  Stage 8 (65 years-death/Late Adulthood) Integrity vs. Despair        Abuse/Neglect     Row Name 04/23/20 1022       Personal Safety    Feels Unsafe at Home or Work/School  no    Feels Threatened by Someone  no    Does Anyone Try to Keep You From Having Contact with Others or Doing Things Outside Your Home?  no    Physical Signs of Abuse Present  no            Shanthi Floyd, RN

## 2020-04-23 NOTE — THERAPY EVALUATION
Acute Care - Physical Therapy Initial Evaluation  [facility]     Patient Name: Aleshia Purvis  : 1945  MRN: [id number]  Today's Date: 2020   Onset of Illness/Injury or Date of Surgery: 20  Date of Referral to PT: 20  Referring Physician: Jose Luis      Admit Date: 2020    Visit Dx:     ICD-10-CM ICD-9-CM   1. Closed intertrochanteric fracture of hip, left, initial encounter (CMS/[facility]) S72.142A 820.21   2. Fall, initial encounter W19.XXXA E888.9   3. Impaired mobility Z74.09 799.89     Patient Active Problem List   Diagnosis   • Degeneration of intervertebral disc of lumbar region   • Stenosis, spinal, lumbar   • Closed intertrochanteric fracture of hip, left, initial encounter (CMS/[facility])   • Atrial fibrillation (CMS/[facility])   • HLD (hyperlipidemia)   • Hypertension   • Leukocytosis   • Hyperglycemia   • Postoperative anemia due to acute blood loss     Past Medical History:   Diagnosis Date   • Arthritis    • Atrial fibrillation (CMS/[facility])    • Cancer (CMS/[facility])     SKIN   • Degeneration of intervertebral disc of lumbar region 2016   • Hyperlipidemia    • Hypertension    • Postoperative anemia due to acute blood loss 2020     Past Surgical History:   Procedure Laterality Date   • DENTAL PROCEDURE     • FEMUR IM NAILING/RODDING Left 2020    Procedure: INTERNAL FIXATION OF LEFT INTERTROCHANTERIC FEMUR FRACTURE;  Surgeon: Bela Amaya MD;  Location: [facility];  Service: Orthopedics;  Laterality: Left;   • HYSTERECTOMY          PT ASSESSMENT (last 12 hours)      Physical Therapy Evaluation     Row Name 20 1522          PT Evaluation Time/Intention    Subjective Information  no complaints  -LH     Document Type  evaluation  -LH     Mode of Treatment  individual therapy;physical therapy  -LH     Patient Effort  good  -LH     Symptoms Noted During/After Treatment  none  -LH     Comment  this PT in appropriate PPE (mask and gloves), performed proper hand washing   DISCHARGE INSTRUCTIONS:    MEDICATIONS:  · See Medication List (bring to your doctor appointments).      VACCINES:  Most Recent Immunizations   Administered Date(s) Administered   • Pneumococcal polysaccharide, adult, 23 valent 01/18/2019   • Tdap 05/29/2019       ACTIVITY:  · Weigh yourself daily (first thing in the morning, with same amount of clothes on) unless told otherwise by your doctor.  · Continue activity as you were in the hospital, slowly increase to what you were doing previously.  · Up as desired / no restrictions.    SMOKING:  · Avoid all tobacco products and secondhand smoke.  · Smoking Cessation Counseling offered.  · [place] Toll Free Quit Line: [phone]    DIET:  · Limit salt and salty foods unless told otherwise by your doctor.  · Special Diet: Cardiac diet. Encourage supplement nutrition such as Ensure.     · Trouble breathing or chest pain - CALL 911    CONTACT YOUR DOCTOR IF:  · You have symptoms that are not \"normal\" for you.  · You have new or worse symptoms or pain, not relieved by medicine or rest.  · Temperature greater than 101 degrees F, chills or flu like symptoms.  · You gain more than 3 pounds in 2 days.  · Other: Increased confusion      Preventing Pneumonia  Pneumonia is an infection in one or both of the lungs. Those most at risk include older adults, smokers, and people with chronic lung diseases. For example, those with conditions like chronic obstructive pulmonary disease (COPD), including emphysema or chronic bronchitis, asthma, and those with weak immune systems. There are some things you can do to lessen the chance that you will get pneumonia.    Avoid infection  · Wash your hands often:  ? Use soap and warm water.  ? If soap and water aren't available, use a hand  with alcohol in it.  · Avoid touching your face and mouth with your hands.  · Use disposable tissues instead of a handkerchief. Throw used tissues away.  · Avoid people who have a cold or the  -     Row Name 04/23/20 1522          General Information    Patient Profile Reviewed?  yes  -     Onset of Illness/Injury or Date of Surgery  04/21/20  -     Referring Physician  Jose Luis  -     Patient Observations  alert;cooperative;agree to therapy  -     General Observations of Patient  pt reclined in chair no acute distress  -     Prior Level of Function  independent:  -     Equipment Currently Used at Home  none  -     Pertinent History of Current Functional Problem  post op L hip internal fixation (post hip fx from fall)  -     Existing Precautions/Restrictions  fall;hip;left  -     Risks Reviewed  patient:  -     Benefits Reviewed  patient:  -[facility] Name 04/23/20 1522          Cognitive Assessment/Intervention- PT/OT    Orientation Status (Cognition)  oriented x 4  -     Follows Commands (Cognition)  WFL  -[facility] Name 04/23/20 1522          Mobility Assessment/Treatment    Extremity Weight-bearing Status  left lower extremity  -     Left Lower Extremity (Weight-bearing Status)  weight-bearing as tolerated (WBAT)  -[facility] Name 04/23/20 1522          Bed Mobility Assessment/Treatment    Bed Mobility Assessment/Treatment  supine-sit;sit-supine  -     Sit-Supine Thayer (Bed Mobility)  minimum assist (75% patient effort) assist LLE  -[facility] Name 04/23/20 1522          Transfer Assessment/Treatment    Transfer Assessment/Treatment  sit-stand transfer;stand-sit transfer  -     Sit-Stand Thayer (Transfers)  contact guard  -     Stand-Sit [place]ayer (Transfers)  contact guard  -[facility] Name 04/23/20 1522          Sit-Stand Transfer    Assistive Device (Sit-Stand Transfers)  walker, front-wheeled  -[facility] Name 04/23/20 1522          Stand-Sit Transfer    Assistive Device (Stand-Sit Transfers)  walker, front-wheeled  -[facility] Name 04/23/20 1522          Gait/Stairs Assessment/Training    Thayer Level (Gait)  contact guard;verbal cues  -   flu.  · Try to avoid crowded places.  Get vaccinated  Talk with your healthcare provider about vaccinations and whether you should get a yearly flu shot. There are now 2 different pneumonia vaccines. Both of these are needed if you have a chronic disease or fit into the higher risk category.    · Get a flu shot every year as soon as it's available in your area. The flu shot helps prevent you from getting the flu and complications of the flu, such as pneumonia.  · Get pneumococcal pneumonia vaccines. Talk with your healthcare provider about which pneumococcal vaccines are right for you.  Do suggested breathing exercises  Deep breathing and coughing exercises can help clear your lungs. Your healthcare provider may suggest them. If so, you will be shown how to do them. Do them as often as your healthcare provider instructs.  Take care of your body  · Drink at least 6 to 8 glasses of water a day.  · Eat well-balanced, healthy meals.  · Avoid drinking alcohol.  · Don’t smoke. Avoid places where people are smoking.  · Moving around helps keep your lungs clear. Ask your healthcare provider what type of activity is best for you. Walking is often a good choice.  · Get enough rest. Sleep at least 8 hours each night. Rest or nap during the day as needed.  · Ask your healthcare provider when you should schedule follow-up care to make sure the infection is gone.  Date Last Reviewed: 12/1/2016  © 9735-0145 [employer]. [address] [place] [address], [place] [address]. All rights reserved. This information is not intended as a substitute for professional medical care. Always follow your healthcare professional's instructions.    PNEUMONIA    MEDICATIONS:  · See Medication List (bring to your doctor appointments).  · Be sure to take the entire course of any prescribed medications, even if you do not feel sick any longer.    VACCINES:  Most Recent Immunizations   Administered Date(s) Administered   • Pneumococcal     Assistive Device (Gait)  walker, front-wheeled  -     Distance in Feet (Gait)  35  -LH     Pattern (Gait)  step-to  -LH     Deviations/Abnormal Patterns (Gait)  left sided deviations;antalgic;gait speed decreased  -     Bilateral Gait Deviations  forward flexed posture  -LH     Left Sided Gait Deviations  heel strike decreased  -     Comment (Gait/Stairs)  several standing rest breaks 2' pain  -     Row Name 04/23/20 1522          General ROM    GENERAL ROM COMMENTS  BLES functional  -     Row Name 04/23/20 1522          MMT (Manual Muscle Testing)    General MMT Comments  BLEs grossly at least 3/5  -     Row Name 04/23/20 1522          Motor Assessment/Intervention    Additional Documentation  Therapeutic Exercise Interventions (Group);Therapeutic Exercise (Group)  -     Row Name 04/23/20 1522          Therapeutic Exercise    Therapeutic Exercise  supine, lower extremities  -     Additional Documentation  Therapeutic Exercise ([facility])  -[facility] Name 04/23/20 1522          Lower Extremity Supine Therapeutic Exercise    Performed, Supine Lower Extremity (Therapeutic Exercise)  ankle pumps;gluteal sets;quadriceps sets  -     Exercise Type, Supine Lower Extremity (Therapeutic Exercise)  AROM (active range of motion)  -     Sets/Reps Detail, Supine Lower Extremity (Therapeutic Exercise)  1/10  -     Row Name 04/23/20 1522          Pain Assessment    Additional Documentation  Pain Scale: FACES Pre/Post-Treatment (Group)  -     Row Name 04/23/20 1522          Pain Scale: Numbers Pre/Post-Treatment    Pain Location - Side  Left  -LH     Pain Location - Orientation  incisional  -     Pain Location  hip  -     Pain Intervention(s)  Cold applied;Medication (See MAR);Repositioned  -     Row Name             Wound 04/22/20 1404 Left hip Incision    Wound - Properties Group Date first assessed: 04/22/20  - Time first assessed: 1404  -SL Present on Hospital Admission: N  -SL Side: Left  -SL  polysaccharide, adult, 23 valent 01/18/2019   • Tdap 05/29/2019   ·     ACTIVITY:  · Get plenty of rest.  · Keep active and rest in between.  · Coughing should not be suppressed, since this is an important reflex to clear your lungs.  Remember to cover your mouth when coughing to prevent spreading the pneumonia.  · Wash your hands with soap and water to protect others from infection.  · Weigh yourself daily (first thing in the morning, with same amount of clothes on).    SMOKING:  · Avoid all tobacco products and secondhand smoke.  · Smoking damages your lungs natural defenses against lung infections.  · Smoking Cessation Counseling offered.  · [place] Toll Free Quit Line: 1-546.782.2039    DIET:  · Drink lots of fluids, especially water, unless otherwise instructed by your doctor.  Liquids keep you from becoming dehydrated and help loosen mucus in your lungs.  · Eat a well-balanced diet that includes fruits, vegetables and whole grains and limit salt, fat/cholesterol.    · If you have trouble breathing, are short of breath or have dark or bluish fingernails - CALL 911.      CONTACT YOUR DOCTOR IF YOU:  · Are having chest pain that is not relieved by non-prescription pain relievers.  · Have bloody sputum, nausea, vomiting or diarrhea.  · Have problems that may be related to your medicine.  · Continue to have a fever and chills or feel worse after two days.  · Gain more than 3 pounds in 2 days.       Location: hip  -SL Primary Wound Type: Incision  -SL    Row Name 04/23/20 1522          Plan of Care Review    Plan of Care Reviewed With  patient  -     Row Name 04/23/20 1522          Physical Therapy Clinical Impression    Date of Referral to PT  04/23/20  -     Criteria for Skilled Interventions Met (PT Clinical Impression)  yes  -     Pathology/Pathophysiology Noted (Describe Specifically for Each System)  musculoskeletal;neuromuscular  -     Impairments Found (describe specific impairments)  ROM;gait, locomotion, and balance;aerobic capacity/endurance  -     Functional Limitations in Following Categories (Describe Specific Limitations)  self-care;home management  -     Rehab Potential (PT Clinical Summary)  good, to achieve stated therapy goals  -     Row Name 04/23/20 1522          Physical Therapy Goals    Bed Mobility Goal Selection (PT)  bed mobility, PT goal 1  -     Transfer Goal Selection (PT)  transfer, PT goal 1  -     Gait Training Goal Selection (PT)  gait training, PT goal 1  -     Row Name 04/23/20 1522          Bed Mobility Goal 1 (PT)    Activity/Assistive Device (Bed Mobility Goal 1, PT)  bed mobility activities, all  -     Weeping [place] Level/Cues Needed (Bed Mobility Goal 1, PT)  supervision required  -     Time Frame (Bed Mobility Goal 1, PT)  1 week  -     Row Name 04/23/20 1522          Transfer Goal 1 (PT)    Activity/Assistive Device (Transfer Goal 1, PT)  transfers, all;walker, rolling  -     [place]eping [place] Level/Cues Needed (Transfer Goal 1, PT)  standby assist  -     Time Frame (Transfer Goal 1, PT)  1 week  -[facility] Name 04/23/20 1522          Gait Training Goal 1 (PT)    Activity/Assistive Device (Gait Training Goal 1, PT)  walker, rolling;assistive device use  -     [place]eping [place] Level (Gait Training Goal 1, PT)  standby assist  -     Distance (Gait Goal 1, PT)  80  -     Time Frame (Gait Training Goal 1, PT)  1 week  -[facility] Name 04/23/20  1522          Positioning and Restraints    Pre-Treatment Position  sitting in chair/recliner  -LH     Post Treatment Position  bed  -LH     In Bed  supine;call light within reach;encouraged to call for assist;exit alarm on  -       User Key  (r) = Recorded By, (t) = Taken By, (c) = Cosigned By    Initials Name Provider Type     Chani Arrington, PT Physical Therapist    DARLENE Palomino, Monica BROWN, RN Registered Nurse          PT Recommendation and Plan  Anticipated Discharge Disposition (PT): home with home health  Planned Therapy Interventions (PT Eval): balance training, bed mobility training, gait training, home exercise program, ROM (range of motion), stair training, strengthening, stretching, transfer training  Therapy Frequency (PT Clinical Impression): daily  Outcome Summary/Treatment Plan (PT)  Anticipated Equipment Needs at Discharge (PT): bedside commode, walker  Anticipated Discharge Disposition (PT): home with home health  Plan of Care Reviewed With: patient  Outcome Summary: pt presents w generalized weakness and pain post L hip internal fixation post fx from fall. this date, pt tolerated ambulation in room CGA rwx WBAT, performed therex. ice offered to pt post therex. baseline, pt independent, lives alone. pt hopeful to return home at DC w assist from daughter- has all appropriate DME however recommend [facility]- [facility] please order. pt may benefit from skilled PT acutely to address functional deficits and assist w DC planning.  Outcome Measures     Row Name 04/23/20 1500             How much help from another person do you currently need...    Turning from your back to your side while in flat bed without using bedrails?  3  -LH      Moving from lying on back to sitting on the side of a flat bed without bedrails?  3  -LH      Moving to and from a bed to a chair (including a wheelchair)?  3  -LH      Standing up from a chair using your arms (e.g., wheelchair, bedside chair)?  3  -LH      Climbing 3-5 steps  with a railing?  3  -      To walk in hospital room?  3  -      AM-PAC 6 Clicks Score (PT)  18  -         Functional Assessment    Outcome Measure Options  AM-PAC 6 Clicks Basic Mobility (PT)  -        User Key  (r) = Recorded By, (t) = Taken By, (c) = Cosigned By    Initials Name Provider Type     Chani Arrignton, PT Physical Therapist         Time Calculation:   PT Charges     Row Name 04/23/20 1532             Time Calculation    Start Time  1406  -      Stop Time  1429  -      Time Calculation (min)  23 min  -      PT Received On  04/23/20  -      PT - Next Appointment  04/24/20  -      PT Goal Re-Cert Due Date  04/30/20  -        User Key  (r) = Recorded By, (t) = Taken By, (c) = Cosigned By    Initials Name Provider Type     Chani Arrington, PT Physical Therapist        Therapy Charges for Today     Code Description Service Date Service Provider Modifiers Qty    46227436843 HC PT EVAL MOD COMPLEXITY 2 4/23/2020 Chani Arrington, PT GP 1    00323015287 HC PT THER PROC EA 15 MIN 4/23/2020 Chani Arrington, PT GP 1    49718201973 HC PT CARE PLAN EACH 15 MIN 4/23/2020 Chani Arrington, PT GP 1          PT G-Codes  Outcome Measure Options: AM-PAC 6 Clicks Basic Mobility (PT)  AM-PAC 6 Clicks Score (PT): 18      Chani Arrington PT  4/23/2020

## 2020-04-23 NOTE — PLAN OF CARE
Problem: Patient Care Overview  Goal: Plan of Care Review  Flowsheets  Taken 4/23/2020 1522  Plan of Care Reviewed With: patient  Taken 4/23/2020 1529  Outcome Summary: pt presents w generalized weakness and pain post L hip internal fixation post fx from fall. this date, pt tolerated ambulation in room CGA rwx WBAT, performed therex. ice offered to pt post therex. baseline, pt independent, lives alone. pt hopeful to return home at DC w assist from daughter- has all appropriate DME however recommend [facility]- [facility] please order. pt may benefit from skilled PT acutely to address functional deficits and assist w DC planning.

## 2020-04-23 NOTE — DISCHARGE INSTRUCTIONS
Discharge and Follow up Instructions:      Hip Fracture Discharge Instructions:    I. ACTIVITIES:  1. Exercises:  ? Complete exercise program as taught by the hospital physical therapist 2 times per day  ? Exercise program will be advanced by the physical therapist  ? During the day be up ambulating every 2 hours (while awake) for short distances  ? Complete the ankle pump exercises at least 10 times per hour (while awake)  ? Elevate legs when in bed and for at least 30 minutes during the day.Use cold packs 20-30 minutes approximately 5 times per day. This should be done before and after completing your exercises and at any time you are experiencing pain/ stiffness in your operative extremity.      2. Activities of Daily Living:  ? No tub baths, hot tubs, or swimming pools for 4 weeks  ? May shower and let water run over the incision on post-operative day #5 if no drainage. Do not scrub or rub the incision. Simply let the water run over the incision and pat dry.    II. Restrictions    ? Your surgeon will discuss with you when you will be able to drive again. Usual guidelines are you are to be off pain medications prior to driving.  ? Weight bearing is WBAT  ? First week stay inside on even terrain. May go up and down stairs one stair at a time utilizing the hand rail once cleared by physical therapy to do so.  ? After one week, you may venture outside (if cleared to do so by physical therapist).    III. Precautions:  ? Everyone that comes near you should wash their hands  ? No elective dental, genital-urinary, or colon procedures or surgical procedures for 12 weeks after surgery unless absolutely necessary.  ?  If dental work or surgical procedure is deemed absolutely necessary, you will need to contact your surgeon as you will need to take antibiotics 1 hour prior to any dental work (including teeth cleanings).  ? Please discuss with your surgeon prophylactic antibiotics as the length of time this intervention will  be necessary for you varies with each patient’s health history and situation.  ? Avoid sick people. If you must be around someone who is ill, they should wear a mask.  ? Avoid visits to the Emergency Room or Urgent Care. If you feel you need to go to the Emergency Room, please notify your Surgeon's office.  ? Stockings are to be worn for one week after surgery and are to be placed on in the morning and removed at night. Observe your skin when stocking is removed for any problems. Monitor the stockings to ensure that any swelling is not causing the stockings to become too tight. In this case, remove stockings immediately.      IV. INCISION CARE:  ? Wash your hands prior to dressing changes  ? Change the dressing as needed to keep incision clean and dry. Utilize dry gauze and paper tape. Avoid touching the side of the gauze that goes against the incision with your hands.  ? No creams or ointments to the incision  ? May remove dressing once the incision is free of drainage  ? Do not touch or pick at the incision  ? Check incision every day and notify surgeon immediately if any of the following signs or symptoms are noted:  o Increase in redness  o Increase in swelling around the incision and of the entire extremity  o Increase in pain  o Drainage oozing from the incision  o Pulling apart of the edges of the incision  o Increase in overall body temperature (greater than 100.5 degrees)  ? Your Staples will be removed May 5, 2020 between 10-14 days postoperation.  This may be done by either the home health nurse, rehabilitation nurse or during your return visit to Dr. Amaya's office.  You will then be instructed on how to care for the incision.  (Please call the office if your staples have not been removed within 14 days after surgery).    V. Medications:   1. Anticoagulants: You will be discharged on an anticoagulant. This is a prophylactic medication that helps prevent blood clots during your post-operative period.   You will be on Lovenox  for 14 days.   ? While taking the anticoagulant, you should avoid taking any additional aspirin, ibuprofen (Advil or Motrin), Aleve (Naprosyn) or other non-steroidal anti-inflammatory medications.   ? Notify surgeon immediately if any akira bleeding is noted in the urine, stool, emesis, or from the nose or the incision. Blood in the stool will often appear as black rather than red. Blood in urine may appear as pink. Blood in emesis may appear as brown/black like coffee grounds.  ? You will need to apply pressure for longer periods of time to any cuts or abrasions to stop bleeding  ? Avoid alcohol while taking anticoagulants    2. Stool Softeners: You will be at greater risk of constipation after surgery due to being less mobile and the pain medications.   ? Take stool softeners as instructed by your surgeon while on pain medications. Over the counter Colace 100 mg 1-2 capsules twice daily.   ? If stools become too loose or too frequent, please decreases the dosage or stop the stool softener.  ? If constipation occurs despite use of stool softeners, you are to continue the stool softeners and add a laxative (Milk of Magnesia 1 ounce daily as needed).  ? Dulcolax oral tabs or suppository, or a fleets enema can also be utilized for constipation and can be obtained over the counter.   ? If above interventions are unsuccessful in inducing bowel movements, please contact your surgeon's office / family physician's office.  ? Drink plenty of fluids, and eat fruits and vegetables during your recovery time    3. Pain Medications utilized after surgery are narcotics and the law requires that the following information be given to all patients that are prescribed narcotics:  ? CLASSIFICATION: Pain medications are called Opioids and are narcotics  ? LEGALITIES: It is illegal to share narcotics with others and to drive within 24 hours of taking narcotics  ? POTENTIAL SIDE EFFECTS: Potential side effects of  opioids include: nausea, vomiting, itching, dizziness, drowsiness, dry mouth, constipation, and difficulty urinating.  ? POTENTIAL ADVERSE EFFECTS:   o Opioid tolerance can develop with use of pain medications and this simply means that it requires more and more of the medication to control pain; however, this is seen more in patients that use opioids for longer periods of time.  o Opioid dependence can develop with use of Opioids and this simply means that to stop the medication can cause withdrawal symptoms; however, this is seen with patients that use Opioids for longer periods of time.  o Opioid addiction can develop with use of Opioids and the incidence of this is very unlikely in patients who take the medications as ordered and stop the medications as instructed.  o Opioid overdose can be dangerous, but is unlikely when the medication is taken as ordered and stopped when ordered. It is important not to mix opioids with alcohol or with and type of sedative such as Benadryl as this can lead to over sedation and respiratory difficulty.  ? DOSAGE:   o Pain medications will need to be taken consistently for the first week to decrease pain and promote adequate pain relief and participation in physical therapy.  o After the initial surgical pain begins to resolve, you may begin to decrease the pain medication. By the end of 6 weeks, you should be off of pain medications.  o Refills will not be given by the office during evening hours, on weekends, or after 6 weeks post-op.  o To seek refills on pain medications during the initial 6 week post-operative period, you must call the office 48 hours in advance to request the refill. The office will then notify you when to  the prescription. DO NOT wait until you are out of the medication to request a refill.    V. FOLLOW-UP VISITS:  ? You will need to follow up in the office with your surgeon on June 1 ,2020. Please call this number [phone]  to schedule this  appointment.  If you have any concerns or suspected complications prior to your follow up visit, please call your surgeons office. Do not wait until your appointment time if you suspect complications. These will need to be addressed in the office promptly.

## 2020-04-23 NOTE — PLAN OF CARE
Problem: Skin Injury Risk (Adult)  Goal: Identify Related Risk Factors and Signs and Symptoms  Outcome: Ongoing (interventions implemented as appropriate)  Goal: Skin Health and Integrity  Outcome: Ongoing (interventions implemented as appropriate)     Problem: Fall Risk (Adult)  Goal: Identify Related Risk Factors and Signs and Symptoms  Outcome: Ongoing (interventions implemented as appropriate)  Goal: Absence of Fall  Outcome: Ongoing (interventions implemented as appropriate)     Problem: Pain, Chronic (Adult)  Goal: Identify Related Risk Factors and Signs and Symptoms  Outcome: Ongoing (interventions implemented as appropriate)  Goal: Acceptable Pain/Comfort Level and Functional Ability  Outcome: Ongoing (interventions implemented as appropriate)     Problem: Patient Care Overview  Goal: Plan of Care Review  Outcome: Ongoing (interventions implemented as appropriate)  Flowsheets  Taken 4/23/2020 0527 by Mitchel Troy RN  Progress: improving  Taken 4/23/2020 1522 by Chani Arrington, PT  Plan of Care Reviewed With: patient  Note:   VSS. Up with assist to chair and amb with walker and PT to door and back. Pain well controlled with Norco. Mirilax x1. Top mepilex stained, to be changed miranda per Dr Amaya.   Goal: Individualization and Mutuality  Outcome: Ongoing (interventions implemented as appropriate)  Goal: Discharge Needs Assessment  Outcome: Ongoing (interventions implemented as appropriate)  Goal: Interprofessional Rounds/Family Conf  Outcome: Ongoing (interventions implemented as appropriate)

## 2020-04-23 NOTE — PROGRESS NOTES
Patient: Aleshia Purvis  YOB: 1945     Date of Admission: 4/21/2020 11:27 PM Medical Record Number: [id number]     Attending Physician: Nick Hernandez MD    Procedure(s):  INTERNAL FIXATION OF LEFT INTERTROCHANTERIC FEMUR FRACTURE Post Operative Day Number: 1    Subjective : No new orthopaedic complaints     Pain Relief: some relief with present medication.     Systemic Complaints: No Complaints  Vitals:    04/23/20 0100 04/23/20 0521 04/23/20 0922 04/23/20 1403   BP: 117/63 108/65 123/64 120/72   BP Location: Right arm Right arm Right arm Right arm   Patient Position: Lying Lying Lying Lying   Pulse: 74 71 74 80   Resp: 16 16 16 16   Temp: 96.7 °F (35.9 °C) 97.7 °F (36.5 °C) 97.2 °F (36.2 °C) 97.4 °F (36.3 °C)   TempSrc: Oral Oral Oral Oral   SpO2: 96% 95% 98% 96%   Weight:       Height:           Physical Exam: 75 y.o. female    General Appearance:       Alert, cooperative, in no acute distress                  Extremities:    Dressing Clean, Dry and Intact         Incision healthy without signs or symptoms of infections         No clinical sign of DVT        Able to do good movements of digits    Pulses:   Pulses palpable and equal bilaterally           Diagnostic Tests:     Results from last 7 days   Lab Units 04/23/20 0448 04/22/20  0545 04/22/20 0048   WBC 10*3/mm3 10.40 12.04* 11.05*   HEMOGLOBIN g/dL 9.9* 12.8 12.9   HEMATOCRIT % 29.3* 38.7 38.3   PLATELETS 10*3/mm3 181 199 216     Results from last 7 days   Lab Units 04/23/20 0448 04/22/20  0545 04/22/20  0048   SODIUM mmol/L 137 139 140   POTASSIUM mmol/L 4.8 5.0 5.0   CHLORIDE mmol/L 102 103 103   CO2 mmol/L 23.1 24.6 28.7   BUN mg/dL 16 19 22   CREATININE mg/dL 0.89 1.06* 1.05*   GLUCOSE mg/dL 150* 127* 121*   CALCIUM mg/dL 8.6 8.9 9.1     Results from last 7 days   Lab Units 04/22/20  0048   INR  1.00   APTT seconds 29.5     No results found for: CRP  No results found for: SEDRATE  No results found for: URICACID  No results found  for: CRYSTAL  Microbiology Results (last 10 days)     ** No results found for the last 240 hours. **        Xr Chest 1 View    Result Date: 4/22/2020  No acute findings.  This report was finalized on 4/22/2020 12:37 AM by Dr. Evelin Baxter M.D.      Fl C Arm During Surgery    Result Date: 4/22/2020   As described.  This report was finalized on 4/22/2020 3:05 PM by Dr. Gil Lyn M.D.      Xr Hip With Or Without Pelvis 1 View Left    Result Date: 4/22/2020   Postsurgical changes.    This report was finalized on 4/22/2020 3:58 PM by Dr. Gil Lyn M.D.      Xr Hip With Or Without Pelvis 2 - 3 View Left    Result Date: 4/22/2020   As described.  This report was finalized on 4/22/2020 3:05 PM by Dr. Gil Lyn M.D.      Xr Hip With Or Without Pelvis 2 - 3 View Left    Result Date: 4/22/2020  Intertrochanteric left femoral fracture.  This report was finalized on 4/22/2020 12:35 AM by Dr. Evelin Baxter M.D.              Current Medications:  Scheduled Meds:  enoxaparin 40 mg Subcutaneous Daily   nebivolol 5 mg Oral Q24H   polyethylene glycol 17 g Oral Daily     Continuous Infusions:  sodium chloride 75 mL/hr Last Rate: 75 mL/hr (04/22/20 2785)     PRN Meds:.•  acetaminophen  •  aluminum-magnesium hydroxide-simethicone  •  bisacodyl  •  HYDROcodone-acetaminophen  •  HYDROcodone-acetaminophen  •  Morphine  •  ondansetron **OR** ondansetron  •  sodium chloride    Assessment:    Procedure(s):  INTERNAL FIXATION OF LEFT INTERTROCHANTERIC FEMUR FRACTURE    Patient Active Problem List   Diagnosis   • Degeneration of intervertebral disc of lumbar region   • Stenosis, spinal, lumbar   • Closed intertrochanteric fracture of hip, left, initial encounter (CMS/[facility])   • Atrial fibrillation (CMS/[facility])   • HLD (hyperlipidemia)   • Hypertension   • Leukocytosis   • Hyperglycemia   • Postoperative anemia due to acute blood loss       PLAN:   Continues current post-op course  Anticoagulation: Lovenox  started  Dressing Change prn  Mobilize with PT as tolerated per protocol    Weight Bearing: WBAT  Discharge Plan: OK to plan for discharge in  tomorrow to home and home health  from orthopadic perspective.      Bela Amaya MD    Date: 4/23/2020    Time: 17:06

## 2020-04-23 NOTE — PLAN OF CARE
Problem: Skin Injury Risk (Adult)  Goal: Identify Related Risk Factors and Signs and Symptoms  Outcome: Ongoing (interventions implemented as appropriate)     Problem: Pain, Chronic (Adult)  Goal: Identify Related Risk Factors and Signs and Symptoms  Outcome: Ongoing (interventions implemented as appropriate)     Problem: Patient Care Overview  Goal: Plan of Care Review  Outcome: Ongoing (interventions implemented as appropriate)  Flowsheets (Taken 4/23/2020 4055)  Progress: improving  Plan of Care Reviewed With: patient  Outcome Summary: vss, c/o pain on movement medicated with notco, tolerating clear liquid diet increase fluidtake, will advance to regular diet in am, encourage to ambulate more in the hallway,

## 2020-04-23 NOTE — PROGRESS NOTES
"   LOS: 1 day   Patient Care Team:  Maria Elena Hayward MD as PCP - General (Internal Medicine)    Chief Complaint: left thigh pain    Subjective     C/o pain in left thigh, but better than yesterday. No other complaints. Tolerating diet. Voiding well. No BM. No cardiac symptoms. No SOA.       Subjective:  Symptoms:  Improved.  No shortness of breath, malaise, cough, chest pain, weakness, headache, chest pressure, anorexia, diarrhea or anxiety.    Diet:  Adequate intake.  No nausea or vomiting.    Activity level: Impaired due to pain.    Pain:  She complains of pain that is moderate.  She reports pain is improving.  Pain is well controlled and requiring pain medication.        History taken from: patient chart    Objective     Vital Signs  Temp:  [96.7 °F (35.9 °C)-98.5 °F (36.9 °C)] 97.2 °F (36.2 °C)  Heart Rate:  [68-93] 74  Resp:  [12-16] 16  BP: (108-137)/(63-84) 123/64    Objective:  General Appearance:  Comfortable and in no acute distress.    Vital signs: (most recent): Blood pressure 123/64, pulse 74, temperature 97.2 °F (36.2 °C), temperature source Oral, resp. rate 16, height 160 cm (63\"), weight 52.6 kg (116 lb), SpO2 98 %.  Vital signs are normal.  No fever.    Output: Producing urine and no stool output.    HEENT: Normal HEENT exam.    Lungs:  Normal effort and normal respiratory rate.  Breath sounds clear to auscultation.  She is not in respiratory distress.    Heart: Normal rate.  Regular rhythm.  Positive for murmur.    Abdomen: Abdomen is soft.  Bowel sounds are normal.   There is no abdominal tenderness.     Extremities: There is no dependent edema.  (NVI in distal BLEs, SCDs in place)  Pulses: Distal pulses are intact.    Neurological: Patient is alert and oriented to person, place and time.  Normal strength.  Patient has normal muscle tone.    Pupils:  Pupils are equal, round, and reactive to light.    Skin:  Warm and dry.  No rash.             Results Review:     I reviewed the patient's new " clinical results.  I reviewed the patient's other test results and agree with the interpretation  Discussed with pt and RN    Results from last 7 days   Lab Units 04/23/20  0448 04/22/20  0545 04/22/20  0048   WBC 10*3/mm3 10.40 12.04* 11.05*   HEMOGLOBIN g/dL 9.9* 12.8 12.9   PLATELETS 10*3/mm3 181 199 216     Results from last 7 days   Lab Units 04/23/20  0448 04/22/20  0545 04/22/20  0048   SODIUM mmol/L 137 139 140   POTASSIUM mmol/L 4.8 5.0 5.0   CHLORIDE mmol/L 102 103 103   CO2 mmol/L 23.1 24.6 28.7   BUN mg/dL 16 19 22   CREATININE mg/dL 0.89 1.06* 1.05*   CALCIUM mg/dL 8.6 8.9 9.1   Estimated Creatinine Clearance: 45.4 mL/min (by C-G formula based on SCr of 0.89 mg/dL).    Medication Review: reviewed    Assessment/Plan       Closed intertrochanteric fracture of hip, left, initial encounter (CMS/[facility])    Degeneration of intervertebral disc of lumbar region    Stenosis, spinal, lumbar    Atrial fibrillation (CMS/[facility])    HLD (hyperlipidemia)    Hypertension    Leukocytosis    Hyperglycemia    Postoperative anemia due to acute blood loss          Plan:   (POD#1 s/p gamma nail of left intertrochanteric femur fracture by Dr. Amaya  Restarted her BB at attenuated dose pre-op, BPs stable on current regimen of Bystolic 5mg daily, Lisinopril remains on hold  SCDs and Lovenox for DVT ppx   Trend Hgb postop  WBC now normalized (suspect elevation was reactive only)  Cr now normalized (suspect some mild CKD at baseline based on review of chart)  HgbA1c wnl     Full code confirmed  Dispo: CCP to assist with SNF placement, PT eval pending today).       Nick Hernandez MD  04/23/20  10:10    Time: 25min

## 2020-04-24 VITALS
TEMPERATURE: 97.8 F | DIASTOLIC BLOOD PRESSURE: 88 MMHG | HEIGHT: 63 IN | WEIGHT: 116 LBS | OXYGEN SATURATION: 95 % | RESPIRATION RATE: 16 BRPM | BODY MASS INDEX: 20.55 KG/M2 | SYSTOLIC BLOOD PRESSURE: 130 MMHG | HEART RATE: 84 BPM

## 2020-04-24 LAB
ANION GAP SERPL CALCULATED.3IONS-SCNC: 9.2 MMOL/L (ref 5–15)
BUN BLD-MCNC: 20 MG/DL (ref 8–23)
BUN/CREAT SERPL: 21.3 (ref 7–25)
CALCIUM SPEC-SCNC: 8.4 MG/DL (ref 8.6–10.5)
CHLORIDE SERPL-SCNC: 104 MMOL/L (ref 98–107)
CO2 SERPL-SCNC: 25.8 MMOL/L (ref 22–29)
CREAT BLD-MCNC: 0.94 MG/DL (ref 0.57–1)
DEPRECATED RDW RBC AUTO: 42.2 FL (ref 37–54)
ERYTHROCYTE [DISTWIDTH] IN BLOOD BY AUTOMATED COUNT: 12.6 % (ref 12.3–15.4)
GFR SERPL CREATININE-BSD FRML MDRD: 58 ML/MIN/1.73
GLUCOSE BLD-MCNC: 115 MG/DL (ref 65–99)
HCT VFR BLD AUTO: 26.6 % (ref 34–46.6)
HGB BLD-MCNC: 9 G/DL (ref 12–15.9)
MCH RBC QN AUTO: 31.4 PG (ref 26.6–33)
MCHC RBC AUTO-ENTMCNC: 33.8 G/DL (ref 31.5–35.7)
MCV RBC AUTO: 92.7 FL (ref 79–97)
PLATELET # BLD AUTO: 167 10*3/MM3 (ref 140–450)
PMV BLD AUTO: 10.6 FL (ref 6–12)
POTASSIUM BLD-SCNC: 4.4 MMOL/L (ref 3.5–5.2)
RBC # BLD AUTO: 2.87 10*6/MM3 (ref 3.77–5.28)
SODIUM BLD-SCNC: 139 MMOL/L (ref 136–145)
WBC NRBC COR # BLD: 10.1 10*3/MM3 (ref 3.4–10.8)

## 2020-04-24 PROCEDURE — 25010000002 ENOXAPARIN PER 10 MG: Performed by: ORTHOPAEDIC SURGERY

## 2020-04-24 PROCEDURE — 97110 THERAPEUTIC EXERCISES: CPT

## 2020-04-24 PROCEDURE — 85027 COMPLETE CBC AUTOMATED: CPT | Performed by: HOSPITALIST

## 2020-04-24 PROCEDURE — 80048 BASIC METABOLIC PNL TOTAL CA: CPT | Performed by: HOSPITALIST

## 2020-04-24 RX ORDER — NEBIVOLOL 5 MG/1
5 TABLET ORAL
Qty: 30 TABLET | Refills: 0 | Status: SHIPPED | OUTPATIENT
Start: 2020-04-25

## 2020-04-24 RX ORDER — ACETAMINOPHEN 325 MG/1
650 TABLET ORAL EVERY 4 HOURS PRN
Qty: 30 TABLET | Refills: 0 | Status: SHIPPED | OUTPATIENT
Start: 2020-04-24

## 2020-04-24 RX ADMIN — NEBIVOLOL HYDROCHLORIDE 5 MG: 5 TABLET ORAL at 09:03

## 2020-04-24 RX ADMIN — HYDROCODONE BITARTRATE AND ACETAMINOPHEN 1 TABLET: 7.5; 325 TABLET ORAL at 09:03

## 2020-04-24 RX ADMIN — ENOXAPARIN SODIUM 40 MG: 40 INJECTION SUBCUTANEOUS at 09:03

## 2020-04-24 RX ADMIN — POLYETHYLENE GLYCOL 3350 17 G: 17 POWDER, FOR SOLUTION ORAL at 09:03

## 2020-04-24 NOTE — DISCHARGE SUMMARY
Patient Name: Aleshia Purvis  : 1945  MRN: [id number]    Date of Admission: 2020  Date of Discharge:  2020  Primary Care Physician: Maria Elena Hayward MD      Chief Complaint:   Fall and Hip Pain      Discharge Diagnoses     Active Hospital Problems    Diagnosis  POA   • **Closed intertrochanteric fracture of hip, left, initial encounter (CMS/[facility]) [S72.142A]  Yes   • Postoperative anemia due to acute blood loss [D62]  No   • Atrial fibrillation (CMS/[facility]) [I48.91]  Yes   • HLD (hyperlipidemia) [E78.5]  Yes   • Hypertension [I10]  Yes   • Leukocytosis [D72.829]  Yes   • Hyperglycemia [R73.9]  Yes   • Stenosis, spinal, lumbar [M48.061]  Yes   • Degeneration of intervertebral disc of lumbar region [M51.36]  Yes      Resolved Hospital Problems   No resolved problems to display.        Hospital Course     Very pleasant 74yo woman admitted with left intertrochanteric femur fracture after fall at home. Please see below for details of admission:    POD#2 s/p gamma nail of left intertrochanteric femur fracture by Dr. Amaya  Restarted her BB at attenuated dose pre-op, BPs stable on current regimen of Bystolic 5mg daily, Lisinopril remains on hold  Lovenox for DVT ppx per Ortho  Hgb stable postop  WBC now normalized (suspect elevation was reactive only)  Cr now normalized (suspect some mild CKD at baseline based on review of chart)  HgbA1c wnl   Plans to go home with dtr and home health, this okay with Dr. Amaya.    Day of Discharge     Feeling good today. Eager to go home with home health and the assistance of her daughter. Has all assistive equipment at home already.    Physical Exam:  Temp:  [97.1 °F (36.2 °C)-98.2 °F (36.8 °C)] 97.8 °F (36.6 °C)  Heart Rate:  [76-87] 84  Resp:  [16] 16  BP: (113-130)/(66-88) 130/88  Body mass index is 20.55 kg/m².  Physical Exam  General Appearance:  Comfortable and in no acute distress.     Vital signs are normal.  No fever.    Output: Producing urine and no  stool output.    HEENT: Normal HEENT exam.    Lungs:  Normal effort and normal respiratory rate.  Breath sounds clear to auscultation.  She is not in respiratory distress.    Heart: Normal rate.  Regular rhythm.  Positive for murmur.  Abdomen: Abdomen is soft.  Bowel sounds are normal.   There is no abdominal tenderness.     Extremities: There is no dependent edema.  (NVI in distal BLEs, SCDs in place)  Pulses: Distal pulses are intact.    Neurological: Patient is alert and oriented to person, place and time.  Normal strength.  Patient has normal muscle tone.    Pupils:  Pupils are equal, round, and reactive to light.    Skin:  Warm and dry.  No rash.     Consultants     Consult Orders (all) (From admission, onward)     Start     Ordered    04/22/20 1730  Inpatient Consult to Case Management   Once     Provider:  (Not yet assigned)    04/22/20 1729    04/22/20 0314  Inpatient Orthopedic Surgery Consult  Once     Specialty:  Orthopedic Surgery  Provider:  Bela Amaya MD    04/22/20 0313    04/22/20 0252  Inpatient Case Management  Consult  Once     Provider:  (Not yet assigned)    04/22/20 0251    04/22/20 0044  [facility] (on-call MD unless specified) Details  Once     Specialty:  Hospitalist  Provider:  (Not yet assigned)    04/22/20 0043              Procedures     Imaging Results (All)     Procedure Component Value Units Date/Time    XR Hip With or Without Pelvis 1 View Left [[id number]] Collected:  04/22/20 1557     Updated:  04/22/20 1601    Narrative:       XR HIP W OR WO PELVIS 1 VIEW LEFT-     INDICATIONS: Postoperative evaluation.     TECHNIQUE: Frontal views of the pelvis and left hip     COMPARISON: 04/22/2020 0001 hours     FINDINGS:      Intact appearing right and screws surgical hardware is seen transfixing  the left intertrochanteric fracture, with adjacent surgical soft tissue  gas, overlying skin staples.          Impression:          Postsurgical changes.             This report was finalized on 4/22/2020 3:58 PM by Dr. Gil Lyn M.D.       XR Hip With or Without Pelvis 2 - 3 View Left [[id number]] Collected:  04/22/20 1504     Updated:  04/22/20 1508    Narrative:       XR HIP W OR WO PELVIS 2-3 VIEW LEFT-, FL C ARM DURING SURGERY-     INDICATIONS: Left intertrochanteric fracture     TECHNIQUE: FLUOROSCOPIC ASSISTANCE IN THE OPERATING ROOM.     FINDINGS:     7 intraoperative fluoroscopic spot views were obtained and recorded the  PACS for review, revealing diana and screw hardware fixation of left  intertrochanteric fracture. Please see operative report for full  details.     Fluoroscopy time: 44.3 seconds       Impression:          As described.     This report was finalized on 4/22/2020 3:05 PM by Dr. Gil Lyn M.D.       FL C Arm During Surgery [[id number]] Collected:  04/22/20 1504     Updated:  04/22/20 1508    Narrative:       XR HIP W OR WO PELVIS 2-3 VIEW LEFT-, FL C ARM DURING SURGERY-     INDICATIONS: Left intertrochanteric fracture     TECHNIQUE: FLUOROSCOPIC ASSISTANCE IN THE OPERATING ROOM.     FINDINGS:     7 intraoperative fluoroscopic spot views were obtained and recorded the  PACS for review, revealing diana and screw hardware fixation of left  intertrochanteric fracture. Please see operative report for full  details.     Fluoroscopy time: 44.3 seconds       Impression:          As described.     This report was finalized on 4/22/2020 3:05 PM by Dr. Gil Lyn M.D.       XR Chest 1 View [[id number]] Collected:  04/22/20 0036     Updated:  04/22/20 0040    Narrative:       PORTABLE CHEST RADIOGRAPH     HISTORY: Preoperative examination     COMPARISON: None available.     FINDINGS:  Cardiomegaly is present. There is no vascular congestion. No  pneumothorax or pleural effusion is seen. No acute infiltrates are seen.       Impression:       No acute findings.     This report was finalized on 4/22/2020 12:37 AM by Dr. Waters  MARILYN Baxter.       XR Hip With or Without Pelvis 2 - 3 View Left [[id number]] Collected:  04/22/20 0032     Updated:  04/22/20 0039    Narrative:       AP VIEW OF THE PELVIS PLUS 2 VIEWS LEFT HIP     HISTORY: Left hip injury     COMPARISON: None available.     FINDINGS:  There is a comminuted intertrochanteric fracture of the left femur. No  additional fractures are seen. Sacroiliac joints appear well-preserved.  No pelvic fractures are seen.       Impression:       Intertrochanteric left femoral fracture.     This report was finalized on 4/22/2020 12:35 AM by Dr. Evelin Baxter M.D.             Pertinent Labs     Results from last 7 days   Lab Units 04/24/20 0407 04/23/20 0448 04/22/20 0545 04/22/20  0048   WBC 10*3/mm3 10.10 10.40 12.04* 11.05*   HEMOGLOBIN g/dL 9.0* 9.9* 12.8 12.9   PLATELETS 10*3/mm3 167 181 199 216     Results from last 7 days   Lab Units 04/24/20 0407 04/23/20 0448 04/22/20 0545 04/22/20  0048   SODIUM mmol/L 139 137 139 140   POTASSIUM mmol/L 4.4 4.8 5.0 5.0   CHLORIDE mmol/L 104 102 103 103   CO2 mmol/L 25.8 23.1 24.6 28.7   BUN mg/dL 20 16 19 22   CREATININE mg/dL 0.94 0.89 1.06* 1.05*   GLUCOSE mg/dL 115* 150* 127* 121*   Estimated Creatinine Clearance: 42.9 mL/min (by C-G formula based on SCr of 0.94 mg/dL).  Results from last 7 days   Lab Units 04/22/20  0048   ALBUMIN g/dL 3.80   BILIRUBIN mg/dL 0.3   ALK PHOS U/L 82   AST (SGOT) U/L 17   ALT (SGPT) U/L 13     Results from last 7 days   Lab Units 04/24/20 0407 04/23/20 0448 04/22/20 0545 04/22/20  0048   CALCIUM mg/dL 8.4* 8.6 8.9 9.1   ALBUMIN g/dL  --   --   --  3.80               Invalid input(s): LDLCALC        Test Results Pending at Discharge       Discharge Details        Discharge Medications      New Medications      Instructions Start Date   acetaminophen 325 MG tablet  Commonly known as:  TYLENOL   650 mg, Oral, Every 4 Hours PRN      enoxaparin 40 MG/0.4ML solution syringe  Commonly known as:  LOVENOX   40  mg, Subcutaneous, Daily      HYDROcodone-acetaminophen 7.5-325 MG per tablet  Commonly known as:  NORCO   1 tablet, Oral, Every 4 Hours PRN         Changes to Medications      Instructions Start Date   nebivolol 5 MG tablet  Commonly known as:  BYSTOLIC  What changed:    · medication strength  · how much to take  · when to take this   5 mg, Oral, Every 24 Hours Scheduled   Start Date:  April 25, 2020        Stop These Medications    Advil 200 MG tablet  Generic drug:  ibuprofen     aspirin 81 MG tablet     CALCIUM CARBONATE-VITAMIN D3 PO     cyclobenzaprine 10 MG tablet  Commonly known as:  FLEXERIL     furosemide 20 MG tablet  Commonly known as:  LASIX     lisinopril 10 MG tablet  Commonly known as:  PRINIVIL,ZESTRIL     Premarin 0.625 MG/GM vaginal cream  Generic drug:  conjugated estrogens            No Known Allergies      Discharge Disposition:  Home-Health Care Svc    Discharge Diet:  Diet Order   Procedures   • Diet Regular       Discharge Activity:   per PT and Ortho recs    CODE STATUS:    Code Status and Medical Interventions:   Ordered at: 04/22/20 0313     Code Status:    CPR     Medical Interventions (Level of Support Prior to Arrest):    Full       No future appointments.  Additional Instructions for the Follow-ups that You Need to Schedule     Ambulatory Referral to Home Health   As directed      Face to Face Visit Date:  4/23/2020    Follow-up provider for Plan of Care?:  I will be treating the patient on an ongoing basis.  Please send me the Plan of Care for signature.    Follow-up provider:  TOMER KHAN [[id number]]    Reason/Clinical Findings:  Left femur IM nail for comminuted intertrochanteric fracture    Describe mobility limitations that make leaving home difficult:  PAIN AND IMMOBILITY    Nursing/Therapeutic Services Requested:  Physical Therapy (ELLIE staples left hip on May 5, 2020 and apply steristrips)    PT orders:  Gait Training (WBAT for transfers only)    Weight Bearing Status:   As Tolerated    Frequency:  Other (2-3 x PER WEEK FOR 2-3 WEEKS)         Ambulatory Referral to Home Health   As directed      Face to Face Visit Date:  4/24/2020    Follow-up provider for Plan of Care?:  I treated the patient in an acute care facility and will not continue treatment after discharge.    Follow-up provider:  MARIA ELENA JONES [[id number]]    Reason/Clinical Findings:  Hip fracture    Describe mobility limitations that make leaving home difficult:  Requires the assistance of another to leave the home    Nursing/Therapeutic Services Requested:  Skilled Nursing Physical Therapy    Skilled nursing orders:  Medication education    PT orders:  Total joint pathway    Frequency:  1 Week 1         Discharge Follow-up with PCP   As directed       Currently Documented PCP:    MariaE lena Jones MD    PCP Phone Number:    [phone]     Follow Up Details:  Dr. Jonse (PCP) in 3 weeks         Discharge Follow-up with Specified Provider: Dr. Amaya (Ortho); 2 Weeks   As directed      To:  Dr. Amaya (Ortho)    Follow Up:  2 Weeks           Follow-up Information     Bela Amaya MD Follow up on 6/1/2020.    Specialty:  Orthopedic Surgery  Contact information:  [address] [facility] [address]  [place] [address]  [phone]             aMria Elena Jones MD .    Specialty:  Internal Medicine  Why:  Dr. Jones (PCP) in 3 weeks  Contact information:  [address] [facility]  [place] [address]  [phone]             [facility] REFERRAL [place] .    Specialty:  Home Health Services  Contact information:  [address] [facility] [address]  [facility] [address]                 Additional Instructions for the Follow-ups that You Need to Schedule     Ambulatory Referral to Home Health   As directed      Face to Face Visit Date:  4/23/2020    Follow-up provider for Plan of Care?:  I will be treating the patient on an ongoing basis.  Please send me the  Plan of Care for signature.    Follow-up provider:  TOMER AMAYA [[id number]]    Reason/Clinical Findings:  Left femur IM nail for comminuted intertrochanteric fracture    Describe mobility limitations that make leaving home difficult:  PAIN AND IMMOBILITY    Nursing/Therapeutic Services Requested:  Physical Therapy (DC staples left hip on May 5, 2020 and apply steristrips)    PT orders:  Gait Training (WBAT for transfers only)    Weight Bearing Status:  As Tolerated    Frequency:  Other (2-3 x PER WEEK FOR 2-3 WEEKS)         Ambulatory Referral to Home Health   As directed      Face to Face Visit Date:  4/24/2020    Follow-up provider for Plan of Care?:  I treated the patient in an acute care facility and will not continue treatment after discharge.    Follow-up provider:  MARIA ELENA JONES [[id number]]    Reason/Clinical Findings:  Hip fracture    Describe mobility limitations that make leaving home difficult:  Requires the assistance of another to leave the home    Nursing/Therapeutic Services Requested:  Skilled Nursing Physical Therapy    Skilled nursing orders:  Medication education    PT orders:  Total joint pathway    Frequency:  1 Week 1         Discharge Follow-up with PCP   As directed       Currently Documented PCP:    Maria Elena Jones MD    PCP Phone Number:    [phone]     Follow Up Details:  Dr. Jones (PCP) in 3 weeks         Discharge Follow-up with Specified Provider: Dr. Amaya (Ortho); 2 Weeks   As directed      To:  Dr. Amaya (Ortho)    Follow Up:  2 Weeks           Time Spent on Discharge:  Greater than 30 minutes      Nick Hernandez MD  [facility]  04/24/20  9:27 AM

## 2020-04-24 NOTE — PROGRESS NOTES
Continued Stay Note  [facility]     Patient Name: Aleshia Purvis  MRN: [id number]  Today's Date: 4/24/2020    Admit Date: 4/21/2020    Discharge Plan     Row Name 04/24/20 1008       Plan    Plan  DME    Plan Comments  Order received for Com[employer]. Faxed to Heidi at [phone]    Row Name 04/24/20 8319       Plan    Plan  Home with [facility] (referral in EPIC)    Plan Comments  Discharged order and HH order noted. Met with pateint who requested [facility]> Referral placed in [facility]. Miroslava with [facility] informed of order and DC today.  Pateint requesting bedside commode. Agreaable to [facility]. Patient stated they could  at DC        Discharge Codes    No documentation.       Expected Discharge Date and Time     Expected Discharge Date Expected Discharge Time    Apr 24, 2020             Shanthi Floyd, RN

## 2020-04-24 NOTE — PLAN OF CARE
Problem: Patient Care Overview  Goal: Plan of Care Review  4/24/2020 1335 by Lore Wiley, PT  Flowsheets (Taken 4/24/2020 1332)  Outcome Summary: Pt planning to DC home today. Has been mobilizing with CGA and use of walker with no safety concerns. Pt has walker at home; daughter planning to  BSC. Heavy education provided on safety precautions and HEP. Pt able to complete all hip exercises with supervision; provided handout for home program. No further acute PT concerns; pt planning to DC home and continue with HH PT.

## 2020-04-24 NOTE — DISCHARGE PLACEMENT REQUEST
"Edgar Purvis (75 y.o. Female)     Date of Birth Social Security Number Address Home Phone MRN    1945  [address]  [place] [address] [phone] [id number]    Roman Catholic Marital Status          Oriental orthodox        Admission Date Admission Type Admitting Provider Attending Provider Department, Room/Bed    4/21/20 Emergency Nick Hernandez MD Benton, John B, MD [address], P690/1    Discharge Date Discharge Disposition Discharge Destination         Home-Health Care [facility]              Attending Provider:  Nick Hernandez MD    Allergies:  No Known Allergies    Isolation:  None   Infection:  None   Code Status:  CPR    Ht:  160 cm (63\")   Wt:  52.6 kg (116 lb)    Admission Cmt:  None   Principal Problem:  Closed intertrochanteric fracture of hip, left, initial encounter (CMS/[facility]) [S72.142A]                 Active Insurance as of 4/21/2020     Primary Coverage     Payor Plan Insurance Group Employer/Plan Group    HUMANA MEDICARE REPLACEMENT HUMANA MEDICARE REPLACEMENT [id number]     Payor Plan Address Payor Plan Phone Number Payor Plan Fax Number Effective Dates    [address] [address] [phone]  1/1/2013 - None Entered    [facility] [address]       Subscriber Name Subscriber Birth Date Member ID       EDGAR PURVIS 1945 [id number]                 Emergency Contacts      (Rel.) Home Phone Work Phone Mobile Phone    BRANDON BLAKE (Daughter) [phone] -- [phone]            "

## 2020-04-24 NOTE — PROGRESS NOTES
Case Management Discharge Note      Final Note: Dischrged home with [facility]. Spoke to [facility]. Shanthi Floyd, RN    Provided Post Acute Provider List?: N/A(Denies needs. Plan is home)          Durable Medical Equipment      Service Provider Request Status Selected Services Address Phone Number Fax Number    [facility] [place] Selected Durable Medical Equipment [address], [facility] [address] [phone]         Home Medical Care      Service Provider Request Status Selected Services Address Phone Number Fax Number    [facility] [place] Selected Home Health Services [address] [place] [address] [address] [phone]        Transportation Services  Private: Car    Final Discharge Disposition Code: 06 - home with home health care

## 2020-04-24 NOTE — PLAN OF CARE
Problem: Patient Care Overview  Goal: Plan of Care Review  Outcome: Ongoing (interventions implemented as appropriate)  Flowsheets (Taken 4/24/2020 6938)  Progress: improving  Plan of Care Reviewed With: patient  Outcome Summary: Norco for pain x1. Dressings x3 to L hip intact, mod amount of drainage to one dressing. Ice applied. Foot pumps on when in bed. IS to 1500. Up with assist, bed alarm for safety. Slept well. Afebrile and VSS. Up in chair this AM. Will continue to monitor.

## 2020-04-24 NOTE — THERAPY DISCHARGE NOTE
Patient Name: Aleshia Purvis  : 1945    MRN: [id number]                              Today's Date: 2020       Admit Date: 2020    Visit Dx:     ICD-10-CM ICD-9-CM   1. Closed intertrochanteric fracture of hip, left, initial encounter (CMS/[facility]) S72.142A 820.21   2. Fall, initial encounter W19.XXXA E888.9   3. Impaired mobility Z74.09 799.89     Patient Active Problem List   Diagnosis   • Degeneration of intervertebral disc of lumbar region   • Stenosis, spinal, lumbar   • Closed intertrochanteric fracture of hip, left, initial encounter (CMS/[facility])   • Atrial fibrillation (CMS/[facility])   • HLD (hyperlipidemia)   • Hypertension   • Leukocytosis   • Hyperglycemia   • Postoperative anemia due to acute blood loss     Past Medical History:   Diagnosis Date   • Arthritis    • Atrial fibrillation (CMS/[facility])    • Cancer (CMS/[facility])     SKIN   • Degeneration of intervertebral disc of lumbar region 2016   • Hyperlipidemia    • Hypertension    • Postoperative anemia due to acute blood loss 2020     Past Surgical History:   Procedure Laterality Date   • DENTAL PROCEDURE     • FEMUR IM NAILING/RODDING Left 2020    Procedure: INTERNAL FIXATION OF LEFT INTERTROCHANTERIC FEMUR FRACTURE;  Surgeon: Bela Amaya MD;  Location: [facility];  Service: Orthopedics;  Laterality: Left;   • HYSTERECTOMY       General Information     Row Name 20 1329          PT Evaluation Time/Intention    Document Type  discharge treatment  -EE     Mode of Treatment  physical therapy  -EE     Row Name 20 1329          General Information    Existing Precautions/Restrictions  fall;hip;left  -EE     Row Name 20 1329          Cognitive Assessment/Intervention- PT/OT    Orientation Status (Cognition)  oriented x 4  -EE     Row Name 20 1329          Safety Issues, Functional Mobility    Impairments Affecting Function (Mobility)  pain;range of motion (ROM);strength  -EE       User Key  (r) = Recorded By,  (t) = Taken By, (c) = Cosigned By    Initials Name Provider Type    Lore Storey, VIRGINIE Physical Therapist        Mobility     Row Name 04/24/20 1330          Bed Mobility Assessment/Treatment    Comment (Bed Mobility)  Bed mobility not assessed; pt states she was up earlier today. Ambulated without difficulty during PT session yesterday. Pt denies concerns with transfers and ambulation; would like to rest as she is planning to DC early this afternoon.   -EE     Row Name 04/24/20 1330          Mobility Assessment/Intervention    Extremity Weight-bearing Status  left lower extremity  -EE     Left Lower Extremity (Weight-bearing Status)  weight-bearing as tolerated (WBAT)  -EE       User Key  (r) = Recorded By, (t) = Taken By, (c) = Cosigned By    Initials Name Provider Type    Lore Storey PT Physical Therapist        Obj/Interventions     Row Name 04/24/20 1330          Therapeutic Exercise    Lower Extremity (Therapeutic Exercise)  gluteal sets;quad sets, bilateral;heel slides, bilateral  -EE     Lower Extremity Range of Motion (Therapeutic Exercise)  hip abduction/adduction, bilateral;ankle dorsiflexion/plantar flexion, bilateral  -EE     Exercise Type (Therapeutic Exercise)  AROM (active range of motion);isometric contraction, static  -EE     Position (Therapeutic Exercise)  supine  -EE     Sets/Reps (Therapeutic Exercise)  x10 reps  -EE     Comment (Therapeutic Exercise)  Reinforced education on HEP for home. Provided handout with all exercises for home program; pt demonstrates understanding with no further questions at this time.   -EE       User Key  (r) = Recorded By, (t) = Taken By, (c) = Cosigned By    Initials Name Provider Type    Lore Storey PT Physical Therapist        Goals/Plan     Row Name 04/24/20 1339          Bed Mobility Goal 1 (PT)    Progress/Outcomes (Bed Mobility Goal 1, PT)  goal not met  -EE     Row Name 04/24/20 1334          Transfer Goal 1 (PT)    Progress/Outcome (Transfer Goal  1, PT)  goal not met  -EE     Row Name 04/24/20 1894          Gait Training Goal 1 (PT)    Progress/Outcome (Gait Training Goal 1, PT)  goal not met  -EE       User Key  (r) = Recorded By, (t) = Taken By, (c) = Cosigned By    Initials Name Provider Type    Lore Storey PT Physical Therapist        Clinical Impression     Row Name 04/24/20 1332          Pain Assessment    Additional Documentation  Pain Scale: Numbers Pre/Post-Treatment (Group)  -EE     Row Name 04/24/20 1332          Pain Scale: Numbers Pre/Post-Treatment    Pain Scale: Numbers, Pretreatment  2/10  -EE     Pain Scale: Numbers, Post-Treatment  4/10  -EE     Pain Location - Side  Left  -EE     Pain Location - Orientation  incisional  -EE     Pain Location  hip  -EE     Pain Intervention(s)  Repositioned;Rest;Medication (See MAR)  -EE     Row Name 04/24/20 1332          Plan of Care Review    Plan of Care Reviewed With  patient  -EE     Outcome Summary  Pt planning to DC home today. Has been mobilizing with CGA and use of walker with no safety concerns. Pt has walker at home; daughter planning to  BSC. Heavy education provided on safety precautions and HEP. Pt able to complete all hip exercises with supervision; provided handout for home program. No further acute PT concerns; pt planning to DC home and continue with  PT.   -EE     Row Name 04/24/20 1332          Positioning and Restraints    Pre-Treatment Position  in bed  -EE     Post Treatment Position  bed  -EE     In Bed  supine;call light within reach;encouraged to call for assist;exit alarm on  -EE       User Key  (r) = Recorded By, (t) = Taken By, (c) = Cosigned By    Initials Name Provider Type    Lore Storey PT Physical Therapist        Outcome Measures     Row Name 04/24/20 2615          How much help from another person do you currently need...    Turning from your back to your side while in flat bed without using bedrails?  3  -EE     Moving from lying on back to sitting on  the side of a flat bed without bedrails?  3  -EE     Moving to and from a bed to a chair (including a wheelchair)?  3  -EE     Standing up from a chair using your arms (e.g., wheelchair, bedside chair)?  3  -EE     Climbing 3-5 steps with a railing?  3  -EE     To walk in hospital room?  3  -EE     AM-PAC 6 Clicks Score (PT)  18  -EE     Row Name 04/24/20 5244          Functional Assessment    Outcome Measure Options  AM-PAC 6 Clicks Basic Mobility (PT)  -EE       User Key  (r) = Recorded By, (t) = Taken By, (c) = Cosigned By    Initials Name Provider Type    Lore Storey PT Physical Therapist          PT Recommendation and Plan     Plan of Care Reviewed With: patient  Outcome Summary: Pt planning to DC home today. Has been mobilizing with CGA and use of walker with no safety concerns. Pt has walker at home; daughter planning to  BSC. Heavy education provided on safety precautions and HEP. Pt able to complete all hip exercises with supervision; provided handout for home program. No further acute PT concerns; pt planning to DC home and continue with  PT.      Time Calculation:   PT Charges     Row Name 04/24/20 1345             Time Calculation    Start Time  1109  -EE      Stop Time  1126  -EE      Time Calculation (min)  17 min  -EE      PT Received On  04/24/20  -EE         Time Calculation- PT    Total Timed Code Minutes- PT  17 minute(s)  -EE        User Key  (r) = Recorded By, (t) = Taken By, (c) = Cosigned By    Initials Name Provider Type    Lore Storey PT Physical Therapist        Therapy Charges for Today     Code Description Service Date Service Provider Modifiers Qty    [id number]  PT THER PROC EA 15 MIN 4/24/2020 Lore Wiley, PT GP 1          PT G-Codes  Outcome Measure Options: AM-PAC 6 Clicks Basic Mobility (PT)  AM-PAC 6 Clicks Score (PT): 18         Lore Wiley PT  4/24/2020        Patient was not wearing a face mask during this therapy encounter. Therapist used appropriate  personal protective equipment including mask and gloves.  Mask used was standard procedure mask. Appropriate PPE was worn during the entire therapy session. Hand hygiene was completed before and after therapy session. Patient is not in enhanced droplet precautions.

## 2020-04-24 NOTE — PROGRESS NOTES
Continued Stay Note  [facility]     Patient Name: Aleshia Purvis  MRN: [id number]  Today's Date: 4/24/2020    Admit Date: 4/21/2020    Discharge Plan     Row Name 04/24/20 0934       Plan    Plan  Home with [facility] (referral in EP[facility])    Plan Comments  Discharged order and HH order noted. Met with pateint who requested [facility]> Referral placed in EPIC. Miroslava with [facility] informed of order and DC today.  Pateint requesting bedside commode. [facility]rea[facility] to [facility]. Patient stated they could  at DC        Discharge Codes    No documentation.       Expected Discharge Date and Time     Expected Discharge Date Expected Discharge Time    Apr 24, 2020             Shanthi Floyd RN

## 2020-05-19 RX ORDER — NEBIVOLOL 5 MG/1
5 TABLET ORAL
Qty: 30 TABLET | Refills: 0 | Status: CANCELLED | OUTPATIENT
Start: 2020-05-19

## 2024-06-03 ENCOUNTER — APPOINTMENT (OUTPATIENT)
Dept: GENERAL RADIOLOGY | Facility: HOSPITAL | Age: 79
End: 2024-06-03
Payer: MEDICARE

## 2024-06-03 ENCOUNTER — HOSPITAL ENCOUNTER (OUTPATIENT)
Facility: HOSPITAL | Age: 79
Setting detail: OBSERVATION
Discharge: HOME | End: 2024-06-04
Attending: EMERGENCY MEDICINE | Admitting: EMERGENCY MEDICINE
Payer: MEDICARE

## 2024-06-03 DIAGNOSIS — I50.9 CONGESTIVE HEART FAILURE, UNSPECIFIED HF CHRONICITY, UNSPECIFIED HEART FAILURE TYPE: ICD-10-CM

## 2024-06-03 DIAGNOSIS — I48.0 PAROXYSMAL ATRIAL FIBRILLATION: ICD-10-CM

## 2024-06-03 DIAGNOSIS — R07.9 CHEST PAIN, UNSPECIFIED TYPE: Primary | ICD-10-CM

## 2024-06-03 DIAGNOSIS — R79.89 ELEVATED TROPONIN: ICD-10-CM

## 2024-06-03 DIAGNOSIS — E78.00 HYPERCHOLESTEROLEMIA: ICD-10-CM

## 2024-06-03 LAB
ALBUMIN SERPL-MCNC: 4.2 G/DL (ref 3.5–5.2)
ALBUMIN/GLOB SERPL: 1.7 G/DL
ALP SERPL-CCNC: 92 U/L (ref 39–117)
ALT SERPL W P-5'-P-CCNC: 9 U/L (ref 1–33)
ANION GAP SERPL CALCULATED.3IONS-SCNC: 10.1 MMOL/L (ref 5–15)
AST SERPL-CCNC: 17 U/L (ref 1–32)
BASOPHILS # BLD AUTO: 0.06 10*3/MM3 (ref 0–0.2)
BASOPHILS NFR BLD AUTO: 0.8 % (ref 0–1.5)
BILIRUB SERPL-MCNC: 0.7 MG/DL (ref 0–1.2)
BUN SERPL-MCNC: 18 MG/DL (ref 8–23)
BUN/CREAT SERPL: 15 (ref 7–25)
CALCIUM SPEC-SCNC: 9.3 MG/DL (ref 8.6–10.5)
CHLORIDE SERPL-SCNC: 106 MMOL/L (ref 98–107)
CHOLEST SERPL-MCNC: 153 MG/DL (ref 0–200)
CO2 SERPL-SCNC: 22.9 MMOL/L (ref 22–29)
CREAT SERPL-MCNC: 1.2 MG/DL (ref 0.57–1)
DEPRECATED RDW RBC AUTO: 42.8 FL (ref 37–54)
EGFRCR SERPLBLD CKD-EPI 2021: 46.1 ML/MIN/1.73
EOSINOPHIL # BLD AUTO: 0.09 10*3/MM3 (ref 0–0.4)
EOSINOPHIL NFR BLD AUTO: 1.2 % (ref 0.3–6.2)
ERYTHROCYTE [DISTWIDTH] IN BLOOD BY AUTOMATED COUNT: 12.1 % (ref 12.3–15.4)
GEN 5 2HR TROPONIN T REFLEX: 72 NG/L
GLOBULIN UR ELPH-MCNC: 2.5 GM/DL
GLUCOSE SERPL-MCNC: 100 MG/DL (ref 65–99)
HCT VFR BLD AUTO: 43.7 % (ref 34–46.6)
HDLC SERPL-MCNC: 69 MG/DL (ref 40–60)
HGB BLD-MCNC: 14.3 G/DL (ref 12–15.9)
HOLD SPECIMEN: NORMAL
HOLD SPECIMEN: NORMAL
IMM GRANULOCYTES # BLD AUTO: 0.02 10*3/MM3 (ref 0–0.05)
IMM GRANULOCYTES NFR BLD AUTO: 0.3 % (ref 0–0.5)
LDLC SERPL CALC-MCNC: 73 MG/DL (ref 0–100)
LDLC/HDLC SERPL: 1.06 {RATIO}
LYMPHOCYTES # BLD AUTO: 1.4 10*3/MM3 (ref 0.7–3.1)
LYMPHOCYTES NFR BLD AUTO: 18.7 % (ref 19.6–45.3)
MCH RBC QN AUTO: 31.6 PG (ref 26.6–33)
MCHC RBC AUTO-ENTMCNC: 32.7 G/DL (ref 31.5–35.7)
MCV RBC AUTO: 96.5 FL (ref 79–97)
MONOCYTES # BLD AUTO: 0.82 10*3/MM3 (ref 0.1–0.9)
MONOCYTES NFR BLD AUTO: 11 % (ref 5–12)
NEUTROPHILS NFR BLD AUTO: 5.09 10*3/MM3 (ref 1.7–7)
NEUTROPHILS NFR BLD AUTO: 68 % (ref 42.7–76)
NRBC BLD AUTO-RTO: 0 /100 WBC (ref 0–0.2)
NT-PROBNP SERPL-MCNC: 2680 PG/ML (ref 0–1800)
PLATELET # BLD AUTO: 258 10*3/MM3 (ref 140–450)
PMV BLD AUTO: 10.1 FL (ref 6–12)
POTASSIUM SERPL-SCNC: 4.6 MMOL/L (ref 3.5–5.2)
PROT SERPL-MCNC: 6.7 G/DL (ref 6–8.5)
RBC # BLD AUTO: 4.53 10*6/MM3 (ref 3.77–5.28)
SODIUM SERPL-SCNC: 139 MMOL/L (ref 136–145)
TRIGL SERPL-MCNC: 54 MG/DL (ref 0–150)
TROPONIN T DELTA: -7 NG/L
TROPONIN T SERPL HS-MCNC: 79 NG/L
VLDLC SERPL-MCNC: 11 MG/DL (ref 5–40)
WBC NRBC COR # BLD AUTO: 7.48 10*3/MM3 (ref 3.4–10.8)
WHOLE BLOOD HOLD COAG: NORMAL
WHOLE BLOOD HOLD SPECIMEN: NORMAL

## 2024-06-03 PROCEDURE — 93005 ELECTROCARDIOGRAM TRACING: CPT

## 2024-06-03 PROCEDURE — 83880 ASSAY OF NATRIURETIC PEPTIDE: CPT | Performed by: PHYSICIAN ASSISTANT

## 2024-06-03 PROCEDURE — G0378 HOSPITAL OBSERVATION PER HR: HCPCS

## 2024-06-03 PROCEDURE — 93005 ELECTROCARDIOGRAM TRACING: CPT | Performed by: NURSE PRACTITIONER

## 2024-06-03 PROCEDURE — 71045 X-RAY EXAM CHEST 1 VIEW: CPT

## 2024-06-03 PROCEDURE — 80053 COMPREHEN METABOLIC PANEL: CPT | Performed by: EMERGENCY MEDICINE

## 2024-06-03 PROCEDURE — 36415 COLL VENOUS BLD VENIPUNCTURE: CPT

## 2024-06-03 PROCEDURE — 93005 ELECTROCARDIOGRAM TRACING: CPT | Performed by: EMERGENCY MEDICINE

## 2024-06-03 PROCEDURE — 84484 ASSAY OF TROPONIN QUANT: CPT | Performed by: EMERGENCY MEDICINE

## 2024-06-03 PROCEDURE — 93010 ELECTROCARDIOGRAM REPORT: CPT | Performed by: INTERNAL MEDICINE

## 2024-06-03 PROCEDURE — 80061 LIPID PANEL: CPT | Performed by: NURSE PRACTITIONER

## 2024-06-03 PROCEDURE — 85025 COMPLETE CBC W/AUTO DIFF WBC: CPT | Performed by: EMERGENCY MEDICINE

## 2024-06-03 PROCEDURE — 99285 EMERGENCY DEPT VISIT HI MDM: CPT

## 2024-06-03 RX ORDER — PREDNISOLONE ACETATE 10 MG/ML
2 SUSPENSION/ DROPS OPHTHALMIC EVERY 6 HOURS SCHEDULED
Status: DISCONTINUED | OUTPATIENT
Start: 2024-06-03 | End: 2024-06-04 | Stop reason: HOSPADM

## 2024-06-03 RX ORDER — ASPIRIN 81 MG/1
324 TABLET, CHEWABLE ORAL ONCE
Status: COMPLETED | OUTPATIENT
Start: 2024-06-03 | End: 2024-06-03

## 2024-06-03 RX ORDER — CARVEDILOL 6.25 MG/1
6.25 TABLET ORAL 2 TIMES DAILY
COMMUNITY

## 2024-06-03 RX ORDER — SODIUM CHLORIDE 0.9 % (FLUSH) 0.9 %
10 SYRINGE (ML) INJECTION AS NEEDED
Status: DISCONTINUED | OUTPATIENT
Start: 2024-06-03 | End: 2024-06-04 | Stop reason: HOSPADM

## 2024-06-03 RX ORDER — ASPIRIN 81 MG/1
81 TABLET ORAL DAILY
Status: DISCONTINUED | OUTPATIENT
Start: 2024-06-04 | End: 2024-06-04 | Stop reason: HOSPADM

## 2024-06-03 RX ORDER — NITROGLYCERIN 0.4 MG/1
0.4 TABLET SUBLINGUAL
Status: DISCONTINUED | OUTPATIENT
Start: 2024-06-03 | End: 2024-06-04 | Stop reason: HOSPADM

## 2024-06-03 RX ORDER — FAMOTIDINE 20 MG/1
20 TABLET, FILM COATED ORAL DAILY
Status: DISCONTINUED | OUTPATIENT
Start: 2024-06-03 | End: 2024-06-04 | Stop reason: HOSPADM

## 2024-06-03 RX ORDER — LOTEPREDNOL ETABONATE 5 MG/G
1 GEL OPHTHALMIC 3 TIMES DAILY
COMMUNITY

## 2024-06-03 RX ORDER — SODIUM CHLORIDE 9 MG/ML
40 INJECTION, SOLUTION INTRAVENOUS AS NEEDED
Status: DISCONTINUED | OUTPATIENT
Start: 2024-06-03 | End: 2024-06-04 | Stop reason: HOSPADM

## 2024-06-03 RX ORDER — APIXABAN 5 MG/1
5 TABLET, FILM COATED ORAL 2 TIMES DAILY
COMMUNITY

## 2024-06-03 RX ORDER — FUROSEMIDE 20 MG/1
20 TABLET ORAL DAILY PRN
Status: DISCONTINUED | OUTPATIENT
Start: 2024-06-03 | End: 2024-06-04 | Stop reason: HOSPADM

## 2024-06-03 RX ORDER — CONJUGATED ESTROGENS 0.62 MG/G
0.5 CREAM VAGINAL 2 TIMES WEEKLY
COMMUNITY

## 2024-06-03 RX ORDER — FAMOTIDINE 20 MG/1
1 TABLET, FILM COATED ORAL DAILY
COMMUNITY

## 2024-06-03 RX ORDER — FUROSEMIDE 20 MG/1
20 TABLET ORAL DAILY PRN
COMMUNITY

## 2024-06-03 RX ORDER — SODIUM CHLORIDE 0.9 % (FLUSH) 0.9 %
10 SYRINGE (ML) INJECTION EVERY 12 HOURS SCHEDULED
Status: DISCONTINUED | OUTPATIENT
Start: 2024-06-03 | End: 2024-06-04 | Stop reason: HOSPADM

## 2024-06-03 RX ORDER — ASPIRIN 325 MG
325 TABLET ORAL ONCE
Status: DISCONTINUED | OUTPATIENT
Start: 2024-06-03 | End: 2024-06-03

## 2024-06-03 RX ORDER — DAPAGLIFLOZIN 10 MG/1
5 TABLET, FILM COATED ORAL DAILY
COMMUNITY

## 2024-06-03 RX ORDER — CARVEDILOL 6.25 MG/1
6.25 TABLET ORAL ONCE
Status: COMPLETED | OUTPATIENT
Start: 2024-06-03 | End: 2024-06-03

## 2024-06-03 RX ADMIN — PREDNISOLONE ACETATE 2 DROP: 10 SUSPENSION/ DROPS OPHTHALMIC at 20:36

## 2024-06-03 RX ADMIN — CARVEDILOL 6.25 MG: 6.25 TABLET, FILM COATED ORAL at 20:36

## 2024-06-03 RX ADMIN — SACUBITRIL AND VALSARTAN 0.5 TABLET: 24; 26 TABLET, FILM COATED ORAL at 20:37

## 2024-06-03 RX ADMIN — ASPIRIN 324 MG: 81 TABLET, CHEWABLE ORAL at 18:31

## 2024-06-03 RX ADMIN — FAMOTIDINE 20 MG: 20 TABLET, FILM COATED ORAL at 20:36

## 2024-06-03 RX ADMIN — Medication 10 ML: at 20:36

## 2024-06-03 NOTE — ED NOTES
Patient to ER via car from home for heaviness in chest last night with tingling in fingers x 45 minutes last night    Patient reports dizziness and fatigue today  Patient has hx of afib

## 2024-06-03 NOTE — ED PROVIDER NOTES
Pt presents to the ED c/o chest pain and weakness with 45-minute period of chest heaviness over the left anterior chest last night.  Had some radiation to her neck and shoulder.  Pain is currently resolved this time just feeling generally weak.     On exam,   General: No acute distress, nontoxic  HEENT: Mucous membranes moist, atraumatic, EOMI  Neck: Full ROM  Pulm: Symmetric chest rise, nonlabored, lungs CTAB  Cardiovascular: Regular rate and rhythm, intact distal pulses  GI: Soft, nontender, nondistended, no rebound, no guarding, bowel sounds present  MSK: Full ROM, no deformity  Skin: Warm, dry  Neuro: Awake, alert, oriented x 4, GCS 15, moving all extremities, no focal deficits  Psych: Calm, cooperative      EKG - Per my independent interpretation at 1218:    EKG Time: 1214  Rhythm/Rate: Sinus rhythm with a rate of 85  Left axis deviation  Occasional PVCs  No acute ischemic changes  No STEMI       No emergent changes compared to April 22, 2020      Plan: Initial concern for arrhythmia, ACS, electrolyte abnormalities, anemia, renal failure, among others.  Plan for labs, EKG, chest x-ray, and reevaluation with results.    ED Course as of 06/03/24 1852   Mon Jun 03, 2024   1228 Patient presents with a now resolved episode of chest pain and now has generalized weakness.  Patient has known history of CHF, A-fib.  Vitals stable.  Differential diagnoses include but not limited to pneumonia, ACS, A-fib exacerbation. [EE]   1300 EKG independently interpreted myself.  Time 1214.  Sinus rhythm, 85 bpm with occasional PVC.  Normal P/JUANPABLO.  QRS normal with normal axis.  No significant ST abnormalities.  Similar to previous EKG from 4/22/2020. [EE]   1307 Creatinine(!): 1.20 [EE]   1307 proBNP(!): 2,680.0 [EE]   1307 Chest x-ray independently interpreted myself shows no acute infiltrate. [EE]   1311 HS Troponin T(!!): 79 [EE]   1557 Troponin T Delta(!): -7  Plan to discuss the patient with cardiology. [EE]   1600 Patient has  never had chest pain before.  She does have an elevated troponin and a delta of 7.  We will admit to the observation unit.  She is pain-free here at this time. [EE]   1615 Discussed the case with Janny Red, practitioner with the observation unit.  She agrees to admit. [EE]   1633 I discussed case with Dr. Garvin, cardiology.  He agrees to consult. [EE]      ED Course User Index  [EE] Mendez Barth, PA       Bumped troponin elevated BNP, stable chronic creatinine of 1.2.  Plan for observation unit stay for further monitoring.  All questions or concerns addressed.     MD Attestation Note    SHARED VISIT: This visit was performed by BOTH a physician and an APC. The substantive portion of the medical decision making was performed by this attesting physician who made or approved the management plan and takes responsibility for patient management. All studies in the APC note (if performed) were independently interpreted by me.                   Wei Powell MD  06/03/24 [id number]

## 2024-06-03 NOTE — H&P
[facility]   HISTORY AND PHYSICAL    Patient Name: Aleshia Purvis  : 1945  MRN: [id number]  Primary Care Physician:  Maria Elena Hayward MD  Date of admission: 6/3/2024    Subjective   Subjective     Chief Complaint:   Chief Complaint   Patient presents with    Palpitations         HPI:    Aleshia Purvis is a pleasant afebrile 79 y.o.  female with a past medical history of hyperlipidemia, hypertension and atrial fibrillation on Eliquis therapy.     She presents to the Emergency Department at [facility] with complaint of chest pain.  She has been admitted to the ED observation unit for further testing and evaluation.    She describes 45 minutes to an hour of left-sided chest pain that was subsequently accompanied by palpitations and states her pain radiated to her left lip, left jaw and left hand.  She states it resolved on its own.  She states she has never had chest pressure like this previously.  Patient states she has a history of atrial fibrillation, she reports that she was in A-fib for the month of September and underwent cardioversion, she is now compliant with Eliquis and Coreg.    Today patient denies any recurrence of her chest discomfort.  She does endorse some dizziness and generalized weakness.  No fevers or chills.      Review of Systems   All systems were reviewed and negative except for: What is mentioned above    Personal History     Past Medical History:   Diagnosis Date    Arthritis     Atrial fibrillation     Cancer     SKIN    Degeneration of intervertebral disc of lumbar region 2016    Hyperlipidemia     Hypertension     Postoperative anemia due to acute blood loss 2020       Past Surgical History:   Procedure Laterality Date    DENTAL PROCEDURE      FEMUR IM NAILING/RODDING Left 2020    Procedure: INTERNAL FIXATION OF LEFT INTERTROCHANTERIC FEMUR FRACTURE;  Surgeon: Bela Amaya MD;  Location: [facility];  Service: Orthopedics;   Laterality: Left;    HYSTERECTOMY         Family History: family history includes Cancer in her mother; Emphysema in her father. Otherwise pertinent FHx was reviewed and not pertinent to current issue.    Social History:  reports that she has never smoked. She has never used smokeless tobacco. She reports that she does not drink alcohol and does not use drugs.    Home Medications:  Estrogens Conjugated, acetaminophen, apixaban, carvedilol, dapagliflozin Propanediol, famotidine, furosemide, loteprednol etabonate, nebivolol, and sacubitril-valsartan    Allergies:  No Known Allergies    Objective   Objective     Vitals:   Temp:  [97.4 °F (36.3 °C)-97.6 °F (36.4 °C)] 97.6 °F (36.4 °C)  Heart Rate:  [] 52  Resp:  [16-18] 17  BP: (120-166)/(68-84) 152/84  Physical Exam    Constitutional: Awake, alert   Eyes: PERRLA, sclerae anicteric, no conjunctival injection   HENT: NCAT, mucous membranes moist   Neck: Supple, no thyromegaly, no lymphadenopathy, trachea midline   Respiratory: Clear to auscultation bilaterally, nonlabored respirations    Cardiovascular: RRR, no murmurs, rubs, or gallops, palpable pedal pulses bilaterally   Gastrointestinal: Positive bowel sounds, soft, nontender, nondistended   Musculoskeletal: No bilateral ankle edema, no clubbing or cyanosis to extremities   Psychiatric: Appropriate affect, cooperative   Neurologic: Oriented x 3, strength symmetric in all extremities, Cranial Nerves grossly intact to confrontation, speech clear   Skin: No rashes     Result Review    Result Review:  I have personally reviewed the results from the time of this admission to 6/3/2024 18:49 EDT and agree with these findings:  [x]  Laboratory list / accordion  []  Microbiology  [x]  Radiology  [x]  EKG/Telemetry   []  Cardiology/Vascular   []  Pathology  []  Old records  []  Other:  Most notable findings include: High-sensitivity troponin 79, 72, EKG without evidence of ischemia and sinus rhythm.  Chest x-ray  clear,      Assessment & Plan   Assessment / Plan     Brief Patient Summary:  Aleshia Purvis is a 79 y.o. female who is being evaluated for chest pain with history of atrial fibrillation.  She had discomfort yesterday but has been asymptomatic all day today.  Her high-sensitivity troponins are trending down from 79-72.  She is currently in normal sinus rhythm.  Plan to maintain her on telemetry overnight, make her n.p.o. after midnight, recheck troponin in the a.m. and consult cardiology further recommendations.    Active Hospital Problems:  There are no active hospital problems to display for this patient.    Plan:     Chest pain  Palpitations  History of atrial fibrillation  High sensitivity troponin 79, 72, trend  EKG without evidence of ischemia, repeat in AM  Hold eliquis  Telemetry  Coreg 6.25mg tonight, hold a.m. dose for possible stress testing  Regular diet, n.p.o. after midnight    Hypertension  Vitals every 4 hours  Continue home medications      DVT prophylaxis:  Medical DVT prophylaxis orders are present.        CODE STATUS:    Level Of Support Discussed With: Patient  Code Status (Patient has no pulse and is not breathing): CPR (Attempt to Resuscitate)  Medical Interventions (Patient has pulse or is breathing): Full Support    Admission Status:  I believe this patient meets observation status.    Electronically signed by EMILY Ugarte, 06/03/24, 6:33 PM EDT.        87 minutes has been spent by [facility] providers in the care of this patient while under observation status      I have worn appropriate PPE during this patient encounter, sanitized my hands both with entering and exiting patient's room.    I have discussed plan of care with patient including advance care plan and/or surrogate decision maker.  Patient advises that their daughter Maile will be their primary surrogate decision maker

## 2024-06-03 NOTE — PROGRESS NOTES
Clinical Pharmacy Services: Medication History    Aleshia Purvis is a 79 y.o. female presenting to [facility] for   Chief Complaint   Patient presents with    Palpitations       She  has a past medical history of Arthritis, Atrial fibrillation, Cancer, Degeneration of intervertebral disc of lumbar region (4/13/2016), Hyperlipidemia, Hypertension, and Postoperative anemia due to acute blood loss (4/23/2020).    Allergies as of 06/03/2024    (No Known Allergies)       Medication information was obtained from: Patient   Pharmacy and Phone Number:     Prior to Admission Medications       Prescriptions Last Dose Informant Patient Reported? Taking?    acetaminophen (TYLENOL) 325 MG tablet   No No    Take 2 tablets by mouth Every 4 (Four) Hours As Needed for Mild Pain .    carvedilol (COREG) 6.25 MG tablet  Self Yes Yes    Take 1 tablet by mouth 2 (Two) Times a Day.    dapagliflozin Propanediol 10 MG tablet  Self Yes Yes    Take 5 mg by mouth Daily.    Eliquis 5 MG tablet tablet  Self Yes Yes    Take 1 tablet by mouth 2 (Two) Times a Day.    famotidine (PEPCID) 20 MG tablet  Self Yes Yes    Take 1 tablet by mouth Daily.    furosemide (LASIX) 20 MG tablet  Self Yes Yes    Take 1 tablet by mouth Daily As Needed.    loteprednol etabonate (LOTEMAX) 0.5 % gel ophthalmic gel  Self Yes Yes    Administer 1 drop to the right eye 3 (Three) Times a Day.    nebivolol (BYSTOLIC) 5 MG tablet   No No    Take 1 tablet by mouth Daily.    Premarin 0.625 MG/GM vaginal cream  Self Yes Yes    Insert 0.5 g into the vagina 2 (Two) Times a Week.    sacubitril-valsartan (ENTRESTO) 24-26 MG tablet  Self Yes Yes    Take 0.5 tablets by mouth 2 (Two) Times a Day.              Medication notes:     This medication list is complete to the best of my knowledge as of 6/3/2024    Please call if questions.    Neetu Ridley  Medication History Technician   [phone]    6/3/2024 16:44 EDT

## 2024-06-03 NOTE — ED NOTES
Pt states that she was laying in bed when she started to have palpitations, chest pressure, soa. Pressure radiated into shoulder. Denies palpitations, soa, pressure but reports fatigue

## 2024-06-03 NOTE — ED PROVIDER NOTES
EMERGENCY DEPARTMENT ENCOUNTER    Room Number:  03/03  Date of encounter:  6/3/2024  PCP: Maria Elena Hayward MD  Historian: Patient, family  Chronic or social conditions impacting care (social determinants of health): None    HPI:  Chief Complaint: Chest pain, weakness  A complete HPI/ROS/PMH/PSH/SH/FH are unobtainable due to: Nothing    Context: Aleshia Purvis is a 79 y.o. female with a history of atrial fibrillation, CHF, aortic stenosis, mitral valve prolapse.  She presents to the ED c/o acute resolved chest pain, as well as weakness.  Patient reports having a 45-minute episode of chest heaviness over the left anterior chest last night.  The pain did seem to radiate up to her neck and to her shoulder.  She states that time she was short of breath however denies any nausea.  The pain ultimately resolved on its own.  Since that time she has been generally weak.  She has been compliant all her medications.  She denies any previous history of chest pain.    Review of prior external notes (non-ED):   I reviewed cardiology office visit from 5/3/2024.  Patient followed for A-fib, CHF, hypertension.    Review of prior external test results outside of this encounter:  I reviewed a BMP from 3/7/2024.  Creatinine 1.34, potassium 4.5    PAST MEDICAL HISTORY  Active Ambulatory Problems     Diagnosis Date Noted    Degeneration of intervertebral disc of lumbar region 04/13/2016    Stenosis, spinal, lumbar 08/08/2016    Closed intertrochanteric fracture of hip, left, initial encounter 04/22/2020    Atrial fibrillation 04/22/2020    HLD (hyperlipidemia) 04/22/2020    Hypertension 04/22/2020    Leukocytosis 04/22/2020    Hyperglycemia 04/22/2020    Postoperative anemia due to acute blood loss 04/23/2020     Resolved Ambulatory Problems     Diagnosis Date Noted    No Resolved Ambulatory Problems     Past Medical History:   Diagnosis Date    Arthritis     Cancer     Hyperlipidemia          PAST SURGICAL HISTORY  Past Surgical  History:   Procedure Laterality Date    DENTAL PROCEDURE      FEMUR IM NAILING/RODDING Left 4/22/2020    Procedure: INTERNAL FIXATION OF LEFT INTERTROCHANTERIC FEMUR FRACTURE;  Surgeon: Bela Amaya MD;  Location: [facility];  Service: Orthopedics;  Laterality: Left;    HYSTERECTOMY           FAMILY HISTORY  Family History   Problem Relation Age of Onset    Cancer Mother     Emphysema Father          SOCIAL HISTORY  Social History     Socioeconomic History    Marital status:    Tobacco Use    Smoking status: Never    Smokeless tobacco: Never   Substance and Sexual Activity    Alcohol use: No    Drug use: No    Sexual activity: Defer         ALLERGIES  Patient has no known allergies.        REVIEW OF SYSTEMS  All systems reviewed and negative except for those discussed in HPI.       PHYSICAL EXAM    I have reviewed the triage vital signs and nursing notes.    ED Triage Vitals   Temp Heart Rate Resp BP SpO2   06/03/24 1210 06/03/24 1210 06/03/24 1210 06/03/24 1215 06/03/24 1210   97.4 °F (36.3 °C) 101 16 166/82 94 %      Temp src Heart Rate Source Patient Position BP Location FiO2 (%)   -- 06/03/24 1215 06/03/24 1215 06/03/24 1215 --    Monitor Lying Right arm        Physical Exam  GENERAL: Alert, oriented, not distressed  HENT: head atraumatic, no nuchal rigidity  EYES: no scleral icterus, EOMI  CV: regular rhythm with frequent extrasystole, regular rate, 3/6 systolic murmur  RESPIRATORY: normal effort, CTA  ABDOMEN: soft, nontender  MUSCULOSKELETAL: no deformity, FROM, no calf swelling or tenderness  NEURO: alert, moves all extremities, follows commands  SKIN: warm, dry        LAB RESULTS  Recent Results (from the past 24 hour(s))   ECG 12 Lead ED Triage Standing Order; Chest Pain    Collection Time: 06/03/24 12:14 PM   Result Value Ref Range    QT Interval 392 ms    QTC Interval 467 ms   Comprehensive Metabolic Panel    Collection Time: 06/03/24 12:21 PM    Specimen: Blood   Result Value Ref  Range    Glucose 100 (H) 65 - 99 mg/dL    BUN 18 8 - 23 mg/dL    Creatinine 1.20 (H) 0.57 - 1.00 mg/dL    Sodium 139 136 - 145 mmol/L    Potassium 4.6 3.5 - 5.2 mmol/L    Chloride 106 98 - 107 mmol/L    CO2 22.9 22.0 - 29.0 mmol/L    Calcium 9.3 8.6 - 10.5 mg/dL    Total Protein 6.7 6.0 - 8.5 g/dL    Albumin 4.2 3.5 - 5.2 g/dL    ALT (SGPT) 9 1 - 33 U/L    AST (SGOT) 17 1 - 32 U/L    Alkaline Phosphatase 92 39 - 117 U/L    Total Bilirubin 0.7 0.0 - 1.2 mg/dL    Globulin 2.5 gm/dL    A/G Ratio 1.7 g/dL    BUN/Creatinine Ratio 15.0 7.0 - 25.0    Anion Gap 10.1 5.0 - 15.0 mmol/L    eGFR 46.1 (L) >60.0 mL/min/1.73   High Sensitivity Troponin T    Collection Time: 06/03/24 12:21 PM    Specimen: Blood   Result Value Ref Range    HS Troponin T 79 (C) <14 ng/L   Green Top (Gel)    Collection Time: 06/03/24 12:21 PM   Result Value Ref Range    Extra Tube Hold for add-ons.    Lavender Top    Collection Time: 06/03/24 12:21 PM   Result Value Ref Range    Extra Tube hold for add-on    Gold Top - SST    Collection Time: 06/03/24 12:21 PM   Result Value Ref Range    Extra Tube Hold for add-ons.    Light Blue Top    Collection Time: 06/03/24 12:21 PM   Result Value Ref Range    Extra Tube Hold for add-ons.    CBC Auto Differential    Collection Time: 06/03/24 12:21 PM    Specimen: Blood   Result Value Ref Range    WBC 7.48 3.40 - 10.80 10*3/mm3    RBC 4.53 3.77 - 5.28 10*6/mm3    Hemoglobin 14.3 12.0 - 15.9 g/dL    Hematocrit 43.7 34.0 - 46.6 %    MCV 96.5 79.0 - 97.0 fL    MCH 31.6 26.6 - 33.0 pg    MCHC 32.7 31.5 - 35.7 g/dL    RDW 12.1 (L) 12.3 - 15.4 %    RDW-SD 42.8 37.0 - 54.0 fl    MPV 10.1 6.0 - 12.0 fL    Platelets 258 140 - 450 10*3/mm3    Neutrophil % 68.0 42.7 - 76.0 %    Lymphocyte % 18.7 (L) 19.6 - 45.3 %    Monocyte % 11.0 5.0 - 12.0 %    Eosinophil % 1.2 0.3 - 6.2 %    Basophil % 0.8 0.0 - 1.5 %    Immature Grans % 0.3 0.0 - 0.5 %    Neutrophils, Absolute 5.09 1.70 - 7.00 10*3/mm3    Lymphocytes, Absolute 1.40 0.70  - 3.10 10*3/mm3    Monocytes, Absolute 0.82 0.10 - 0.90 10*3/mm3    Eosinophils, Absolute 0.09 0.00 - 0.40 10*3/mm3    Basophils, Absolute 0.06 0.00 - 0.20 10*3/mm3    Immature Grans, Absolute 0.02 0.00 - 0.05 10*3/mm3    nRBC 0.0 0.0 - 0.2 /100 WBC   BNP    Collection Time: 06/03/24 12:21 PM    Specimen: Blood   Result Value Ref Range    proBNP 2,680.0 (H) 0.0 - 1,800.0 pg/mL   High Sensitivity Troponin T 2Hr    Collection Time: 06/03/24  2:30 PM    Specimen: Blood   Result Value Ref Range    HS Troponin T 72 (C) <14 ng/L    Troponin T Delta -7 (L) >=-4 - <+4 ng/L       Ordered the above labs and independently reviewed the results.        RADIOLOGY  XR Chest 1 View    Result Date: 6/3/2024  XR CHEST 1 VW-  HISTORY: Female who is 79 years-old, chest pain  TECHNIQUE: Frontal view of the chest  COMPARISON: 4/22/2020  FINDINGS: The heart is enlarged. Aorta is tortuous. Pulmonary vasculature is unremarkable. No focal pulmonary consolidation, pleural effusion, or pneumothorax. No acute osseous process.      No focal pulmonary consolidation. Cardiomegaly. Tortuous aorta. Follow-up as clinically indicated.    This report was finalized on 6/3/2024 12:41 PM by Dr. Gil Lyn M.D on Workstation: [id number]       I ordered the above noted radiological studies. Reviewed by me and discussed with radiologist.  See dictation for official radiology interpretation.      MEDICATIONS GIVEN IN ER    Medications   sodium chloride 0.9 % flush 10 mL (has no administration in time range)   nitroglycerin (NITROSTAT) SL tablet 0.4 mg (has no administration in time range)   sodium chloride 0.9 % flush 10 mL (has no administration in time range)   sodium chloride 0.9 % flush 10 mL (has no administration in time range)   sodium chloride 0.9 % infusion 40 mL (has no administration in time range)   aspirin chewable tablet 324 mg (has no administration in time range)     And   aspirin EC tablet 81 mg (has no administration in time range)          ADDITIONAL ORDERS CONSIDERED BUT NOT ORDERED:  Nothing      PROGRESS, DATA ANALYSIS, CONSULTS, AND MEDICAL DECISION MAKING    All labs have been independently interpreted by myself.  All radiology studies have been independently interpreted by myself and discussed with radiologist dictating the report.   EKG's independently interpreted by myself.  Discussion below represents my analysis of pertinent findings related to patient's condition, differential diagnosis, treatment plan and final disposition.    I have discussed case with Dr. Powell, emergency room physician.  He has performed his own bedside examination and agrees with treatment plan.    ED Course as of 06/03/24 1633   Mon Jun 03, 2024   1228 Patient presents with a now resolved episode of chest pain and now has generalized weakness.  Patient has known history of CHF, A-fib.  Vitals stable.  Differential diagnoses include but not limited to pneumonia, ACS, A-fib exacerbation. [EE]   1300 EKG independently interpreted myself.  Time 1214.  Sinus rhythm, 85 bpm with occasional PVC.  Normal P/JUANPABLO.  QRS normal with normal axis.  No significant ST abnormalities.  Similar to previous EKG from 4/22/2020. [EE]   1307 Creatinine(!): 1.20 [EE]   1307 proBNP(!): 2,680.0 [EE]   1307 Chest x-ray independently interpreted myself shows no acute infiltrate. [EE]   1311 HS Troponin T(!!): 79 [EE]   1557 Troponin T Delta(!): -7  Plan to discuss the patient with cardiology. [EE]   1600 Patient has never had chest pain before.  She does have an elevated troponin and a delta of 7.  We will admit to the observation unit.  She is pain-free here at this time. [EE]   1615 Discussed the case with Janny Red, practitioner with the observation unit.  She agrees to admit. [EE]   1633 I discussed case with Dr. Gavrin, cardiology.  He agrees to consult. [EE]      ED Course User Index  [EE] Mendez Barth PA       AS OF 16:17 EDT VITALS:    BP - 148/71  HR - 52  TEMP - 97.4 °F  (36.3 °C)  O2 SATS - 98%        DIAGNOSIS  Final diagnoses:   Chest pain, unspecified type   Elevated troponin   Paroxysmal atrial fibrillation   Hypercholesterolemia   Congestive heart failure, unspecified HF chronicity, unspecified heart failure type         DISPOSITION  Admitted      Dictated utilizing Dragon dictation          Mendez Barth PA  06/03/24 [id number]

## 2024-06-03 NOTE — PLAN OF CARE
Goal Outcome Evaluation:              Outcome Evaluation: pt is a 79 year old female admitted to the obs unit for chest pain trop 79-72, pt is aox4, vss, pt has no complaints since arriving to the unit, cardilogy is consulted, pt has no further questions at this time

## 2024-06-03 NOTE — NURSING NOTE
Nursing report ED to floor  Aleshia Purvis  79 y.o.  female    HPI :  HPI (Adult)  Stated Reason for Visit: palpitations, chest pressure    Chief Complaint  Chief Complaint   Patient presents with    Palpitations       Admitting doctor:   Franc Bernal MD    Admitting diagnosis:   The primary encounter diagnosis was Chest pain, unspecified type. Diagnoses of Elevated troponin, Paroxysmal atrial fibrillation, Hypercholesterolemia, and Congestive heart failure, unspecified HF chronicity, unspecified heart failure type were also pertinent to this visit.    Code status:   Current Code Status       Date Active Code Status Order ID Comments User Context       Prior            Allergies:   Patient has no known allergies.    Isolation:   No active isolations    Intake and Output  No intake or output data in the 24 hours ending 06/03/24 1650    Weight:       06/03/24  1215   Weight: 56.2 kg (124 lb)       Most recent vitals:   Vitals:    06/03/24 1301 06/03/24 1331 06/03/24 1401 06/03/24 1501   BP: 130/68 120/71 129/71 148/71   BP Location:       Patient Position:       Pulse: 58 61 60 52   Resp:       Temp:       SpO2: 96% 97% 95% 98%   Weight:       Height:           Active LDAs/IV Access:   Lines, Drains & Airways       Active LDAs       Name Placement date Placement time Site Days    Peripheral IV 06/03/24 1221 Right Antecubital 06/03/24  1221  Antecubital  less than 1                    Labs (abnormal labs have a star):   Labs Reviewed   COMPREHENSIVE METABOLIC PANEL - Abnormal; Notable for the following components:       Result Value    Glucose 100 (*)     Creatinine 1.20 (*)     eGFR 46.1 (*)     All other components within normal limits    Narrative:     GFR Normal >60  Chronic Kidney Disease <60  Kidney Failure <15    The GFR formula is only valid for adults with stable renal function between ages 18 and 70.   TROPONIN - Abnormal; Notable for the following components:    HS Troponin T 79 (*)     All other components  within normal limits    Narrative:     High Sensitive Troponin T Reference Range:  <14.0 ng/L- Negative Female for AMI  <22.0 ng/L- Negative Male for AMI  >=14 - Abnormal Female indicating possible myocardial injury.  >=22 - Abnormal Male indicating possible myocardial injury.   Clinicians would have to utilize clinical acumen, EKG, Troponin, and serial changes to determine if it is an Acute Myocardial Infarction or myocardial injury due to an underlying chronic condition.        CBC WITH AUTO DIFFERENTIAL - Abnormal; Notable for the following components:    RDW 12.1 (*)     Lymphocyte % 18.7 (*)     All other components within normal limits   BNP (IN-HOUSE) - Abnormal; Notable for the following components:    proBNP 2,680.0 (*)     All other components within normal limits    Narrative:     This assay is used as an aid in the diagnosis of individuals suspected of having heart failure. It can be used as an aid in the diagnosis of acute decompensated heart failure (ADHF) in patients presenting with signs and symptoms of ADHF to the emergency department (ED). In addition, NT-proBNP of <300 pg/mL indicates ADHF is not likely.    Age Range Result Interpretation  NT-proBNP Concentration (pg/mL:      <50             Positive            >450                   Gray                 300-450                    Negative             <300    50-75           Positive            >900                  Gray                300-900                  Negative            <300      >75             Positive            >1800                  Gray                300-1800                  Negative            <300   HIGH SENSITIVITIY TROPONIN T 2HR - Abnormal; Notable for the following components:    HS Troponin T 72 (*)     Troponin T Delta -7 (*)     All other components within normal limits    Narrative:     High Sensitive Troponin T Reference Range:  <14.0 ng/L- Negative Female for AMI  <22.0 ng/L- Negative Male for AMI  >=14 - Abnormal  Female indicating possible myocardial injury.  >=22 - Abnormal Male indicating possible myocardial injury.   Clinicians would have to utilize clinical acumen, EKG, Troponin, and serial changes to determine if it is an Acute Myocardial Infarction or myocardial injury due to an underlying chronic condition.        LIPID PANEL - Abnormal; Notable for the following components:    HDL Cholesterol 69 (*)     All other components within normal limits    Narrative:     Cholesterol Reference Ranges  (U.S. Department of Health and Human Services ATP III Classifications)    Desirable          <200 mg/dL  Borderline High    200-239 mg/dL  High Risk          >240 mg/dL      Triglyceride Reference Ranges  (U.S. Department of Health and Human Services ATP III Classifications)    Normal           <150 mg/dL  Borderline High  150-199 mg/dL  High             200-499 mg/dL  Very High        >500 mg/dL    HDL Reference Ranges  (U.S. Department of Health and Human Services ATP III Classifications)    Low     <40 mg/dl (major risk factor for CHD)  High    >60 mg/dl ('negative' risk factor for CHD)        LDL Reference Ranges  (U.S. Department of Health and Human Services ATP III Classifications)    Optimal          <100 mg/dL  Near Optimal     100-129 mg/dL  Borderline High  130-159 mg/dL  High             160-189 mg/dL  Very High        >189 mg/dL   RAINBOW DRAW    Narrative:     The following orders were created for panel order [place] Draw.  Procedure                               Abnormality         Status                     ---------                               -----------         ------                     Green Top (Gel)[[id number]]                                  Final result               Lavender Top[[id number]]                                     Final result               Gold Top - SST[[id number]]                                   Final result               Light Blue Top[[id number]]                                   Final  result                 Please view results for these tests on the individual orders.   CBC AND DIFFERENTIAL    Narrative:     The following orders were created for panel order CBC & Differential.  Procedure                               Abnormality         Status                     ---------                               -----------         ------                     CBC Auto Differential[[id number]]        Abnormal            Final result                 Please view results for these tests on the individual orders.   GREEN TOP   LAVENDER TOP   GOLD TOP - SST   LIGHT BLUE TOP       EKG:   ECG 12 Lead ED Triage Standing Order; Chest Pain   Preliminary Result   HEART RATE= 85  bpm   RR Interval= 706  ms   UT Interval= 174  ms   P Horizontal Axis= 15  deg   P Front Axis= 14  deg   QRSD Interval= 105  ms   QT Interval= 392  ms   QTcB= 467  ms   QRS Axis= -48  deg   T Wave Axis= 75  deg   - ABNORMAL ECG -   Sinus rhythm   Paired ventricular premature complexes   Probable left atrial enlargement   Left anterior fascicular block   Abnormal R-wave progression, late transition   Left ventricular hypertrophy   Electronically Signed By:    Date and Time of Study: 2024-06-03 12:14:18      Telemetry Scan   Final Result      Telemetry Scan   Final Result      Telemetry Scan   Final Result      ECG 12 Lead Chest Pain    (Results Pending)   ECG 12 Lead Chest Pain    (Results Pending)   ECG 12 Lead Chest Pain    (Results Pending)       Meds given in ED:   Medications   sodium chloride 0.9 % flush 10 mL (has no administration in time range)   nitroglycerin (NITROSTAT) SL tablet 0.4 mg (has no administration in time range)   sodium chloride 0.9 % flush 10 mL (has no administration in time range)   sodium chloride 0.9 % flush 10 mL (has no administration in time range)   sodium chloride 0.9 % infusion 40 mL (has no administration in time range)   aspirin chewable tablet 324 mg (has no administration in time range)     And   aspirin EC  tablet 81 mg (has no administration in time range)       Imaging results:  XR Chest 1 View    Result Date: 6/3/2024  No focal pulmonary consolidation. Cardiomegaly. Tortuous aorta. Follow-up as clinically indicated.    This report was finalized on 6/3/2024 12:41 PM by Dr. Gil Lyn M.D on Workstation: [employer]       Ambulatory status:   - br    Social issues:   Social History     Socioeconomic History    Marital status:    Tobacco Use    Smoking status: Never    Smokeless tobacco: Never   Substance and Sexual Activity    Alcohol use: No    Drug use: No    Sexual activity: Defer       Peripheral Neurovascular  Peripheral Neurovascular (Adult)  Peripheral Neurovascular WDL: WDL    Neuro Cognitive  Neuro Cognitive (Adult)  Cognitive/Neuro/Behavioral WDL: WDL  Pupils  Pupil PERRLA: yes    Learning  Learning Assessment (Adult)  Learning Readiness and Ability: no barriers identified    Respiratory  Respiratory WDL  Respiratory WDL: WDL    Abdominal Pain       Pain Assessments  Pain (Adult)  (0-10) Pain Rating: Rest: 0    NIH Stroke Scale       Mendez Martinez RN  06/03/24 16:50 EDT

## 2024-06-04 ENCOUNTER — APPOINTMENT (OUTPATIENT)
Dept: CARDIOLOGY | Facility: HOSPITAL | Age: 79
End: 2024-06-04
Payer: MEDICARE

## 2024-06-04 VITALS
WEIGHT: 124 LBS | OXYGEN SATURATION: 99 % | BODY MASS INDEX: 21.97 KG/M2 | HEART RATE: 56 BPM | SYSTOLIC BLOOD PRESSURE: 120 MMHG | RESPIRATION RATE: 17 BRPM | TEMPERATURE: 97.5 F | DIASTOLIC BLOOD PRESSURE: 73 MMHG | HEIGHT: 63 IN

## 2024-06-04 PROBLEM — R07.9 CHEST PAIN: Status: ACTIVE | Noted: 2024-06-04

## 2024-06-04 LAB
ANION GAP SERPL CALCULATED.3IONS-SCNC: 8.1 MMOL/L (ref 5–15)
ASCENDING AORTA: 1.6 CM
BH CV ECHO MEAS - ACS: 2.37 CM
BH CV ECHO MEAS - AI P1/2T: 718.1 MSEC
BH CV ECHO MEAS - AO MAX PG: 11.3 MMHG
BH CV ECHO MEAS - AO MEAN PG: 5.2 MMHG
BH CV ECHO MEAS - AO ROOT DIAM: 3.7 CM
BH CV ECHO MEAS - AO V2 MAX: 168.4 CM/SEC
BH CV ECHO MEAS - AO V2 VTI: 35.4 CM
BH CV ECHO MEAS - AVA(I,D): 1.93 CM2
BH CV ECHO MEAS - EDV(CUBED): 115 ML
BH CV ECHO MEAS - EDV(MOD-SP2): 67 ML
BH CV ECHO MEAS - EDV(MOD-SP4): 88 ML
BH CV ECHO MEAS - EF(MOD-BP): 52.9 %
BH CV ECHO MEAS - EF(MOD-SP2): 50.7 %
BH CV ECHO MEAS - EF(MOD-SP4): 54.5 %
BH CV ECHO MEAS - ESV(CUBED): 29.9 ML
BH CV ECHO MEAS - ESV(MOD-SP2): 33 ML
BH CV ECHO MEAS - ESV(MOD-SP4): 40 ML
BH CV ECHO MEAS - FS: 36.2 %
BH CV ECHO MEAS - IVS/LVPW: 0.9 CM
BH CV ECHO MEAS - IVSD: 0.91 CM
BH CV ECHO MEAS - LAT PEAK E' VEL: 3.2 CM/SEC
BH CV ECHO MEAS - LV DIASTOLIC VOL/BSA (35-75): 55.8 CM2
BH CV ECHO MEAS - LV MASS(C)D: 164.1 GRAMS
BH CV ECHO MEAS - LV MAX PG: 3.4 MMHG
BH CV ECHO MEAS - LV MEAN PG: 1.49 MMHG
BH CV ECHO MEAS - LV SYSTOLIC VOL/BSA (12-30): 25.3 CM2
BH CV ECHO MEAS - LV V1 MAX: 92.8 CM/SEC
BH CV ECHO MEAS - LV V1 VTI: 21.9 CM
BH CV ECHO MEAS - LVIDD: 4.9 CM
BH CV ECHO MEAS - LVIDS: 3.1 CM
BH CV ECHO MEAS - LVOT AREA: 3.1 CM2
BH CV ECHO MEAS - LVOT DIAM: 2 CM
BH CV ECHO MEAS - LVPWD: 1.01 CM
BH CV ECHO MEAS - MED PEAK E' VEL: 3.1 CM/SEC
BH CV ECHO MEAS - MR MAX PG: 127.5 MMHG
BH CV ECHO MEAS - MR MAX VEL: 564.7 CM/SEC
BH CV ECHO MEAS - MV A DUR: 0.16 SEC
BH CV ECHO MEAS - MV A MAX VEL: 95.4 CM/SEC
BH CV ECHO MEAS - MV DEC SLOPE: 179 CM/SEC2
BH CV ECHO MEAS - MV DEC TIME: 0.26 SEC
BH CV ECHO MEAS - MV E MAX VEL: 62.4 CM/SEC
BH CV ECHO MEAS - MV E/A: 0.65
BH CV ECHO MEAS - MV MAX PG: 4.3 MMHG
BH CV ECHO MEAS - MV MEAN PG: 1.51 MMHG
BH CV ECHO MEAS - MV P1/2T: 121 MSEC
BH CV ECHO MEAS - MV V2 VTI: 34.4 CM
BH CV ECHO MEAS - MVA(P1/2T): 1.82 CM2
BH CV ECHO MEAS - MVA(VTI): 1.99 CM2
BH CV ECHO MEAS - PA ACC TIME: 0.16 SEC
BH CV ECHO MEAS - PA V2 MAX: 74.2 CM/SEC
BH CV ECHO MEAS - PULM A REVS DUR: 0.14 SEC
BH CV ECHO MEAS - PULM A REVS VEL: 19.9 CM/SEC
BH CV ECHO MEAS - PULM DIAS VEL: 45.1 CM/SEC
BH CV ECHO MEAS - PULM S/D: 1.62
BH CV ECHO MEAS - PULM SYS VEL: 72.8 CM/SEC
BH CV ECHO MEAS - QP/QS: 1.22
BH CV ECHO MEAS - RAP SYSTOLE: 3 MMHG
BH CV ECHO MEAS - RV MAX PG: 2.34 MMHG
BH CV ECHO MEAS - RV V1 MAX: 76.5 CM/SEC
BH CV ECHO MEAS - RV V1 VTI: 16.2 CM
BH CV ECHO MEAS - RVOT DIAM: 2.6 CM
BH CV ECHO MEAS - RVSP: 35 MMHG
BH CV ECHO MEAS - SV(LVOT): 68.4 ML
BH CV ECHO MEAS - SV(MOD-SP2): 34 ML
BH CV ECHO MEAS - SV(MOD-SP4): 48 ML
BH CV ECHO MEAS - SV(RVOT): 83.3 ML
BH CV ECHO MEAS - SVI(LVOT): 43.3 ML/M2
BH CV ECHO MEAS - SVI(MOD-SP2): 21.5 ML/M2
BH CV ECHO MEAS - SVI(MOD-SP4): 30.4 ML/M2
BH CV ECHO MEAS - TAPSE (>1.6): 2.7 CM
BH CV ECHO MEAS - TR MAX PG: 31.7 MMHG
BH CV ECHO MEAS - TR MAX VEL: 281.6 CM/SEC
BH CV ECHO MEASUREMENTS AVERAGE E/E' RATIO: 19.81
BH CV XLRA - RV BASE: 3.4 CM
BH CV XLRA - RV LENGTH: 5.8 CM
BH CV XLRA - RV MID: 2.7 CM
BH CV XLRA - TDI S': 13.9 CM/SEC
BUN SERPL-MCNC: 18 MG/DL (ref 8–23)
BUN/CREAT SERPL: 16.7 (ref 7–25)
CALCIUM SPEC-SCNC: 8.6 MG/DL (ref 8.6–10.5)
CHLORIDE SERPL-SCNC: 109 MMOL/L (ref 98–107)
CO2 SERPL-SCNC: 23.9 MMOL/L (ref 22–29)
CREAT SERPL-MCNC: 1.08 MG/DL (ref 0.57–1)
DEPRECATED RDW RBC AUTO: 39.8 FL (ref 37–54)
EGFRCR SERPLBLD CKD-EPI 2021: 52.4 ML/MIN/1.73
ERYTHROCYTE [DISTWIDTH] IN BLOOD BY AUTOMATED COUNT: 11.7 % (ref 12.3–15.4)
GLUCOSE SERPL-MCNC: 86 MG/DL (ref 65–99)
HCT VFR BLD AUTO: 38.2 % (ref 34–46.6)
HGB BLD-MCNC: 12.8 G/DL (ref 12–15.9)
LEFT ATRIUM VOLUME INDEX: 35.8 ML/M2
MAGNESIUM SERPL-MCNC: 2.1 MG/DL (ref 1.6–2.4)
MCH RBC QN AUTO: 31.2 PG (ref 26.6–33)
MCHC RBC AUTO-ENTMCNC: 33.5 G/DL (ref 31.5–35.7)
MCV RBC AUTO: 93.2 FL (ref 79–97)
PLATELET # BLD AUTO: 213 10*3/MM3 (ref 140–450)
PMV BLD AUTO: 10 FL (ref 6–12)
POTASSIUM SERPL-SCNC: 4.2 MMOL/L (ref 3.5–5.2)
QT INTERVAL: 392 MS
QT INTERVAL: 405 MS
QT INTERVAL: 475 MS
QTC INTERVAL: 451 MS
QTC INTERVAL: 462 MS
QTC INTERVAL: 467 MS
RBC # BLD AUTO: 4.1 10*6/MM3 (ref 3.77–5.28)
SINUS: 3.3 CM
SODIUM SERPL-SCNC: 141 MMOL/L (ref 136–145)
STJ: 2.9 CM
TROPONIN T SERPL HS-MCNC: 73 NG/L
WBC NRBC COR # BLD AUTO: 4.65 10*3/MM3 (ref 3.4–10.8)

## 2024-06-04 PROCEDURE — 93306 TTE W/DOPPLER COMPLETE: CPT

## 2024-06-04 PROCEDURE — 83735 ASSAY OF MAGNESIUM: CPT | Performed by: NURSE PRACTITIONER

## 2024-06-04 PROCEDURE — 99204 OFFICE O/P NEW MOD 45 MIN: CPT | Performed by: INTERNAL MEDICINE

## 2024-06-04 PROCEDURE — 93010 ELECTROCARDIOGRAM REPORT: CPT | Performed by: INTERNAL MEDICINE

## 2024-06-04 PROCEDURE — 84484 ASSAY OF TROPONIN QUANT: CPT | Performed by: NURSE PRACTITIONER

## 2024-06-04 PROCEDURE — 93005 ELECTROCARDIOGRAM TRACING: CPT | Performed by: NURSE PRACTITIONER

## 2024-06-04 PROCEDURE — G0378 HOSPITAL OBSERVATION PER HR: HCPCS

## 2024-06-04 PROCEDURE — 93306 TTE W/DOPPLER COMPLETE: CPT | Performed by: INTERNAL MEDICINE

## 2024-06-04 PROCEDURE — 85027 COMPLETE CBC AUTOMATED: CPT | Performed by: NURSE PRACTITIONER

## 2024-06-04 PROCEDURE — 80048 BASIC METABOLIC PNL TOTAL CA: CPT | Performed by: NURSE PRACTITIONER

## 2024-06-04 RX ORDER — CARVEDILOL 6.25 MG/1
6.25 TABLET ORAL 2 TIMES DAILY WITH MEALS
Status: DISCONTINUED | OUTPATIENT
Start: 2024-06-04 | End: 2024-06-04 | Stop reason: HOSPADM

## 2024-06-04 RX ADMIN — PREDNISOLONE ACETATE 2 DROP: 10 SUSPENSION/ DROPS OPHTHALMIC at 09:39

## 2024-06-04 RX ADMIN — PREDNISOLONE ACETATE 2 DROP: 10 SUSPENSION/ DROPS OPHTHALMIC at 02:17

## 2024-06-04 NOTE — DISCHARGE SUMMARY
ED OBSERVATION PROGRESS/DISCHARGE SUMMARY    Date of Admission: 6/3/2024   LOS: 0 days   PCP: Maria Elena Hayward MD    Final Diagnosis: Chest pain, atrial fibrillation    Hospital Outcome:     Ms. Purvis is a 79-year-old female admitted to the observation unit for further evaluation due to chest pain.  Troponin is elevated but has remained flat, 79, 72, 73.  Chest x-ray shows no acute findings.  EKG this morning shows sinus rhythm, no evidence of ischemia.  She had echocardiogram which shows moderate prolapse of posterior mitral valve leaflet, severe eccentric and anteriorly directed mitral regurgitation.  Left ventricular systolic function is low normal, 51 to 55%.  Mild to moderate aortic valve regurgitation present, no aortic valve stenosis present.  Mild to moderate tricuspid valve regurgitation present.  No evidence of pericardial effusion.  No significant change from prior echocardiogram.  She was evaluated by cardiology and has been determined to be safe to discharge.  She has had no chest pain/palpitations since arrival.  She can follow-up with her primary cardiologist in the office.    ROS:  General: no fevers, chills  Respiratory: no cough, dyspnea  Cardiovascular: no chest pain, palpitations  Abdomen: No abdominal pain, nausea, vomiting, or diarrhea  Neurologic: No focal weakness    Objective   Physical Exam:  I have reviewed the vital signs.  Temp:  [97.4 °F (36.3 °C)-97.9 °F (36.6 °C)] 97.5 °F (36.4 °C)  Heart Rate:  [] 56  Resp:  [16-18] 17  BP: (120-166)/(62-85) 120/73  General Appearance:    Alert, cooperative, no distress  Head:    Normocephalic, atraumatic  Eyes:    Sclerae anicteric  Neck:   Supple, no mass  Lungs: Clear to auscultation bilaterally, respirations unlabored  Heart: Regular rate and rhythm, S1 and S2 normal, no murmur, rub or gallop  Abdomen:  Soft, non-tender, bowel sounds active, nondistended  Extremities: No clubbing, cyanosis, or edema to lower extremities  Pulses:  2+ and  symmetric in distal lower extremities  Skin: No rashes   Neurologic: Oriented x3, Normal strength to extremities    Results Review:    I have reviewed the labs, radiology results and diagnostic studies.    Results from last 7 days   Lab Units 06/04/24  0445   WBC 10*3/mm3 4.65   HEMOGLOBIN g/dL 12.8   HEMATOCRIT % 38.2   PLATELETS 10*3/mm3 213     Results from last 7 days   Lab Units 06/04/24  0445 06/03/24  1221   SODIUM mmol/L 141 139   POTASSIUM mmol/L 4.2 4.6   CHLORIDE mmol/L 109* 106   CO2 mmol/L 23.9 22.9   BUN mg/dL 18 18   CREATININE mg/dL 1.08* 1.20*   CALCIUM mg/dL 8.6 9.3   BILIRUBIN mg/dL  --  0.7   ALK PHOS U/L  --  92   ALT (SGPT) U/L  --  9   AST (SGOT) U/L  --  17   GLUCOSE mg/dL 86 100*     Imaging Results (Last 24 Hours)       Procedure Component Value Units Date/Time    XR Chest 1 View [[id number]] Collected: 06/03/24 1240     Updated: 06/03/24 1244    Narrative:      XR CHEST 1 VW-     HISTORY: Female who is 79 years-old, chest pain     TECHNIQUE: Frontal view of the chest     COMPARISON: 4/22/2020     FINDINGS: The heart is enlarged. Aorta is tortuous. Pulmonary  vasculature is unremarkable. No focal pulmonary consolidation, pleural  effusion, or pneumothorax. No acute osseous process.       Impression:      No focal pulmonary consolidation. Cardiomegaly. Tortuous  aorta. Follow-up as clinically indicated.           This report was finalized on 6/3/2024 12:41 PM by Dr. Gil Lyn M.D on Workstation: [id number]               I have reviewed the medications.  ---------------------------------------------------------------------------------------------  Assessment & Plan   Assessment/Problem List    Chest pain      Plan:  Chest pain  Palpitations  History of paroxysmal atrial fibrillation  -Troponin is elevated but has remained flat, 79, 72, 73  -Chest x-ray shows no acute findings  -EKG this morning shows sinus rhythm, no evidence of ischemia  -Ectopy noted on telemetry but no atrial  fibrillation  -Echocardiogram which shows moderate prolapse of posterior mitral valve leaflet, severe eccentric and anteriorly directed mitral regurgitation.  Left ventricular systolic function is low normal, 51 to 55%.  Mild to moderate aortic valve regurgitation present, no aortic valve stenosis present.  Mild to moderate tricuspid valve regurgitation present.  No evidence of pericardial effusion.    -Cardiology consulted  -She follow-up with her primary cardiologist in the office  -Continue Eliquis for anticoagulation, carvedilol    Hypertension  -Continue home regimen    Disposition: Home    Follow-up after Discharge: Primary care, cardiology    This note will serve as a discharge summary    EMILY Longoria 06/04/24 11:59 EDT    I have worn appropriate PPE during this patient encounter, sanitized my hands both with entering and exiting patient's room.    31 minutes has been spent by [facility] providers in the care of this patient while under observation status

## 2024-06-04 NOTE — CASE MANAGEMENT/SOCIAL WORK
Discharge Planning Assessment  [facility]     Patient Name: Aleshia Purvis  MRN: [id number]  Today's Date: 6/4/2024    Admit Date: 6/3/2024    Plan: Patient plans to return home upon discharge, where she lives alone in condo. Daughter to transport. No needs anticipated.   Discharge Needs Assessment       Row Name 06/04/24 1214       Living Environment    People in Home alone    Current Living Arrangements condominium    Potentially Unsafe Housing Conditions none    Primary Care Provided by self    Provides Primary Care For no one    Family Caregiver if Needed child(carolyne), adult    Family Caregiver Names Maile (daughter)    Quality of Family Relationships helpful;involved;supportive    Able to Return to Prior Arrangements yes    Living Arrangement Comments Resides alone in condominium. No vocalized environmental concerns.       Resource/Environmental Concerns    Resource/Environmental Concerns none    Transportation Concerns none       Transition Planning    Patient/Family Anticipates Transition to home    Patient/Family Anticipated Services at Transition none    Transportation Anticipated family or friend will provide       Discharge Needs Assessment    Readmission Within the Last 30 Days no previous admission in last 30 days    Equipment Currently Used at Home none  Patient states she owns a cane that she used after a previous hip arthroplasty, but does not use at baseline.    Concerns to be Addressed no discharge needs identified;denies needs/concerns at this time    Anticipated Changes Related to Illness none    Equipment Needed After Discharge none                   Discharge Plan       Row Name 06/04/24 1216       Plan    Plan Patient plans to return home upon discharge, where she lives alone in condo. Daughter to transport. No needs anticipated.    Patient/Family in Agreement with Plan yes    Plan Comments Patient plans to return home upon discharge, where she lives alone in private condominium. She is IND with  IADL's at baseline without use of AD. She owns a cane from a previous hip arthroplasty, but does not use it at baseline. Denies any recent falls. She drives, but will have transportation today provided by her daughter. She uses [facility] on [address] and denies issues obtaining/affording medications. Not enrolled in Meds to Beds. She denies current HH and/or rehab. She plans to follow up with outpatient cardiology (Dr. Alvarez) at [facility], whom she has seen in the past. No needs anticipated.    Final Discharge Disposition Code 01 - home or self-care                  Continued Care and Services - Admitted Since 6/3/2024    No active coordination exists for this encounter.       Expected Discharge Date and Time       Expected Discharge Date Expected Discharge Time    Jun 4, 2024            Demographic Summary       Row Name 06/04/24 1209       General Information    Admission Type observation    Arrived From home    Required Notices Provided Observation Status Notice  Verified LORD on chart.    Referral Source admission list    Reason for Consult discharge planning    Preferred Language English    General Information Comments Facesheet verified. Role of CCP explained. Appropriate PPE worn at all times.                   Functional Status       Row Name 06/04/24 1211       Functional Status    Usual Activity Tolerance good    Current Activity Tolerance good       Functional Status, IADL    Medications independent    Meal Preparation independent    Housekeeping independent    Laundry independent    Shopping independent    IADL Comments Patient reports baseline IND with IADL's.       Mental Status    General Appearance WDL WDL                   Psychosocial       Row Name 06/04/24 1211       Values/Beliefs    Spiritual, Cultural Beliefs, Jainism Practices, Values that Affect Care no       Behavior WDL    Behavior WDL WDL       Emotion Mood WDL    Emotion/Mood/Affect WDL WDL       Speech WDL    Speech WDL WDL        Perceptual State WDL    Perceptual State WDL WDL       Thought Process WDL    Thought Process WDL WDL       Intellectual Performance WDL    Intellectual Performance WDL WDL    Level of Consciousness Alert       Coping/Stress    Patient Personal Strengths expressive of emotions;expressive of needs;strong support system    Sources of Support adult child(carolyne);spouse       Developmental Stage (Eriksson's)    Developmental Stage Stage 8 (65 years-death/Late Adulthood) Integrity vs. Despair                   Abuse/Neglect       Row Name 06/04/24 1213       Personal Safety    Physical Signs of Abuse Present no                   Legal    No documentation.                  Substance Abuse    No documentation.                  Patient Forms    No documentation.                     Filippo Feng RN

## 2024-06-04 NOTE — PLAN OF CARE
Goal Outcome Evaluation:              Outcome Evaluation: Pt. 79 yr old Female. AOX4 and able to verbalized needs. IV removed. Discharge summary provided and education completed. Pt aware to follow up with Cardiology. Pt aware to follow up with her PCP in 1 week. Pt gathered all her belongins and she did not have any questions at this time. Pt awaiting for transport.

## 2024-06-04 NOTE — PLAN OF CARE
Goal Outcome Evaluation:              Outcome Evaluation: Assumed care of patient. patient denies having any palpitations or chest discomfort during shift. still noted telemetry monitor with PVCs, provider notified. EKG done, provider was made aware of the result. Patient is alert and oriented x4, up ad ace. Maintained on NPO post midnight. Awaiting Cardio review in the morning. Pending AM labs. Plan of care ongoing.

## 2024-06-04 NOTE — PROGRESS NOTES
"MD ATTESTATION NOTE    The HILL and I have discussed this patient's history, physical exam, and treatment plan.  I have reviewed the documentation and personally had a face to face interaction with the patient. I affirm the documentation and agree with the treatment and plan.  The attached note describes my personal findings.      I provided a substantive portion of the care of the patient.  I personally performed the physical exam in its entirety, and below are my findings.    I talked with both the patient and the daughter in the room.  Brief HPI: This is a very pleasant 79-year-old female who has a history of mitral valve prolapse and mitral valve regurg with, aortic stenosis and chronic A-fib on Eliquis.  She presented because on midnight before she came to the emergency department which would be late on the night of June 2 and early morning of Blaire 3 had an episode soon after she laid down to go to bed where she felt like her heart was \"flopping\".  She felt like her heart was beating irregular and fast.  She has had the flopping sensation with atrial fibrillation in the past.  This episode lasted somewhere about 45 minutes to an hour.  She states that she did not have any pain but it was just a discomfort.  The discomfort went to her left side of her neck, left shoulder into her left scapular region.  After it resolved she has had no recurrent symptoms.  She has been completely asymptomatic since that time, in the emergency department and in the observation unit.  Denies any recent exertional chest pain.  She does have chronic shortness of breath with exertion which is at baseline.  She states that shortness of breath with exertion is due to because of her heart valve problem.  She has been compliant with Eliquis and has not missed any doses.  Denies any fevers or chills or coughs or colds.  She is feeling well here in the observation unit.  She was admitted to the observation unit for cardiology consult and for " further observation and monitoring.    General : Elderly female this pleasant.  She is in no acute cardiovascular respiratory distress.  Patient is awake alert and oriented  HEENT: NCAT  CV: Heart is regular rate with 3 out of 6 systolic murmur.  Respiratory: CTA bilaterally  Abd: Soft and nontender  Ext: No acute abnormalities.  No rashes.  Intact distal pulses that are equal strong and symmetric.  No edema to extremities.  Skin: No rash  Neuro: Cranial nerves II through XII grossly intact as tested.  No acute lateralizing deficits.  Psych: Normal mood and affect    I have reviewed the patient's vital signs, lab work, EKG and imaging.    Plan:  #1  Chest discomfort: Sounds like this could be due to an exacerbation of her atrial fibrillation she felt a flopping and she felt like her heart was beating fast.  She never had pain just a discomfort.  Her heart enzymes have remained flat.  She had an echo that was done this morning which showed no change from her previous echo.  She has moderate prolapse of the posterior mitral valve.  She has severe eccentric and anteriorly directed mitral regurg.  Ejection fraction was 51 to 55% mild to moderate aortic valve regurg mild to moderate tricuspid valve regurg.  Cardiology has seen and evaluated.  I discussed the case with Dr. Ballesteros and reports there is no change in her echo and she is good to be discharged home.  I agree with the plan.  I wonder if this is related to an exacerbation of her atrial fibrillation causing her symptoms.  She wants to follow-up with her cardiologist Dr. Alvarez at [facility].  All questions answered        Note Disclaimer: At [facility], we believe that sharing information builds trust and better relationships. You are receiving this note because you recently visited [facility]. It is possible you will see health information before a provider has talked with you about it. This kind of information can be easy to misunderstand. To help  you fully understand what it means for your health, we urge you to discuss this note with your provider.

## 2024-06-04 NOTE — CONSULTS
Cardiology History & Physical / Consultation      Patient Name: Aleshia Purvis  Age/Sex: 79 y.o. female  : 1945  MRN: [id number]    Date of Admission: 6/3/2024  Date of Encounter Visit: 24  Encounter Provider: Raji Garvin MD  Referring Provider: Franc Bernal MD  Place of Service: [facility] CARDIOLOGY  Patient Care Team:  Maria Elena Hayward MD as PCP - General (Internal Medicine)          Subjective:     Chief Complaint: Chest pain, weakness    Reason for consultation: Chest pain    History of Present Illness:  Aleshia Purvis is a 79 y.o. female with a past medical history of atrial fibrillation s/p CV, pulmonary hypertension, HLD, CHF, aortic stenosis, and mitral valve prolapse. She follows with [facility].     She was noted to have moderate to several mitral valve prolapse with moderate to severe MR in .     In 2023 she was noted to have atrial fibrillation with RVR and acute on chronic heart failure exacerbation. She underwent a ROSITA/CV successfully in 2023.      Most recent echocardiogram 2024 revealed normalized EF 55%, elevated LA pressures, moderate LAE, mild LAURENT, moderate aortic valve regurgitation, moderate thickening of mitral valve leaflets with prolapse of posterior mitral valve leaflet, mild to moderate MR and mild aortic root dilatation 3.7 cm.    She presented to the emergency department complaining of a 45 minute episode of chest pain that happened the night prior. Her pain was anterior and over the left side. She described it as a heaviness that radiated up her neck and shoulder. She stated she had associated shortness of breath, but no other symptoms. Her pain resolved spontaneously. She also complains of generalized weakness earlier in the day.  Patient thought she was in atrial fibrillation but ECG did not confirm this.  Overnight she had both PVCs as well as atrial arrhythmias.  She says she has had these for many years.  Heart  rate 101, blood pressure 166/82. Initial EKG shows sinus rhythm. Labs relatively unremarkable. Creatinine 1.2, HS troponin 79, repeat 72, proBNP 2680. Chest x-ray shows cardiomegaly. She was admitted to the observation unit for further evaluation of chest pain.  Patient is doing well this morning she did not have any recurrence of any symptoms overnight        ECHO 4/26/2024  Left ventricular chamber size, wall thickness, and systolic function are within normal limits.   The ejection fraction biplane was calculated at 55%.   Mitral valve tissue Doppler E/E'' ratio consistent with elevated left atrial pressures.   Mildly dilated right ventricle and global RV systolic function is normal.   Moderate left atrial enlargement.   Mild right atrial enlargement.   Moderate aortic regurgitation.   Moderate thickening of mitral valve leaflets with prolapse of posterior leaflet of mitral valve.   Mild to moderate mitral regurgitation.   Mild dilation of the aortic root - 3.7 cm.       Holter Monitor 12/26/2023  Lowest heart rate: 32 bpm which was sinus bradycardia, occurring at 4:20 AM during presumed sleeping hours.   Average  heart rate: 57 bpm   Highest heart rate: 95 bpm which was sinus rhythm.   The primary underlying rhythm was sinus rhythm.   There were occasional (4.9% burden) ventricular ectopic beats, rare couplets and triplets. One 18-beat run of AIVR (rate 69/min) occurring at 12:00 AM during presumed sleeping hours.   There were rare supraventricular ectopic beats, rare couplets and two 4-beat runs of PAT.          Past Medical History:  Past Medical History:   Diagnosis Date    Arthritis     Atrial fibrillation     Cancer     SKIN    Degeneration of intervertebral disc of lumbar region 4/13/2016    Hyperlipidemia     Hypertension     Postoperative anemia due to acute blood loss 4/23/2020       Past Surgical History:   Procedure Laterality Date    DENTAL PROCEDURE      FEMUR IM NAILING/RODDING Left 4/22/2020     Procedure: INTERNAL FIXATION OF LEFT INTERTROCHANTERIC FEMUR FRACTURE;  Surgeon: Bela Amaya MD;  Location: [facility] OR;  Service: Orthopedics;  Laterality: Left;    HYSTERECTOMY         Home Medications:   Medications Prior to Admission   Medication Sig Dispense Refill Last Dose    carvedilol (COREG) 6.25 MG tablet Take 1 tablet by mouth 2 (Two) Times a Day.   6/3/2024    dapagliflozin Propanediol 10 MG tablet Take 5 mg by mouth Daily.   6/3/2024    Eliquis 5 MG tablet tablet Take 1 tablet by mouth 2 (Two) Times a Day.   6/3/2024    famotidine (PEPCID) 20 MG tablet Take 1 tablet by mouth Daily.   6/2/2024    furosemide (LASIX) 20 MG tablet Take 1 tablet by mouth Daily As Needed.   Past Week    loteprednol etabonate (LOTEMAX) 0.5 % gel ophthalmic gel Administer 1 drop to the right eye 3 (Three) Times a Day.   6/3/2024    Premarin 0.625 MG/GM vaginal cream Insert 0.5 g into the vagina 2 (Two) Times a Week.   Past Week    sacubitril-valsartan (ENTRESTO) 24-26 MG tablet Take 0.5 tablets by mouth 2 (Two) Times a Day.   6/3/2024    acetaminophen (TYLENOL) 325 MG tablet Take 2 tablets by mouth Every 4 (Four) Hours As Needed for Mild Pain . 30 tablet 0     nebivolol (BYSTOLIC) 5 MG tablet Take 1 tablet by mouth Daily. 30 tablet 0        Allergies:  No Known Allergies    Past Social History:  Social History     Socioeconomic History    Marital status:    Tobacco Use    Smoking status: Never    Smokeless tobacco: Never   Vaping Use    Vaping status: Never Used   Substance and Sexual Activity    Alcohol use: No    Drug use: No    Sexual activity: Defer       Past Family History: History reviewed. No pertinent family history.   Family History   Problem Relation Age of Onset    Cancer Mother     Emphysema Father        Review of Systems        Objective:     Objective:  Temp:  [97.4 °F (36.3 °C)-97.9 °F (36.6 °C)] 97.6 °F (36.4 °C)  Heart Rate:  [] 57  Resp:  [16-18] 17  BP: (120-166)/(62-85)  125/65  No intake or output data in the 24 hours ending 06/04/24 0941  Body mass index is 21.97 kg/m².      06/03/24  1215 06/04/24  0802   Weight: 56.2 kg (124 lb) 56.2 kg (124 lb)           Physical Exam:   Vitals reviewed.   Constitutional:       Appearance: Healthy appearance.   Pulmonary:      Effort: Pulmonary effort is normal.   Cardiovascular:      PMI at left midclavicular line. Normal rate. Regular rhythm. Normal S1. Normal S2.       Murmurs: There is a grade 3/6 high frequency blowing holosystolic murmur at the apex.      No gallop.  No click. No rub.   Pulses:     Intact distal pulses.   Edema:     Peripheral edema absent.   Neurological:      Mental Status: Alert.          Labs:   Lab Review:     Results from last 7 days   Lab Units 06/04/24  0445 06/03/24  1221   SODIUM mmol/L 141 139   POTASSIUM mmol/L 4.2 4.6   CHLORIDE mmol/L 109* 106   CO2 mmol/L 23.9 22.9   BUN mg/dL 18 18   CREATININE mg/dL 1.08* 1.20*   GLUCOSE mg/dL 86 100*   CALCIUM mg/dL 8.6 9.3   AST (SGOT) U/L  --  17   ALT (SGPT) U/L  --  9     Results from last 7 days   Lab Units 06/04/24  0445 06/03/24  1430 06/03/24  1221   HSTROP T ng/L 73* 72* 79*     Results from last 7 days   Lab Units 06/04/24  0445   WBC 10*3/mm3 4.65   HEMOGLOBIN g/dL 12.8   HEMATOCRIT % 38.2   PLATELETS 10*3/mm3 213         Results from last 7 days   Lab Units 06/03/24  1430   CHOLESTEROL mg/dL 153     Results from last 7 days   Lab Units 06/04/24  0445   MAGNESIUM mg/dL 2.1     Results from last 7 days   Lab Units 06/03/24  1430   CHOLESTEROL mg/dL 153   TRIGLYCERIDES mg/dL 54   HDL CHOL mg/dL 69*   LDL CHOL mg/dL 73     Results from last 7 days   Lab Units 06/03/24  1221   PROBNP pg/mL 2,680.0*                 PREVIOUS EKG:          EKG:             Assessment:       Chest pain        Plan:     1.  Chest discomfort atypical.  ECG does not show ischemic changes.  She has atrial as well as ventricular arrhythmias which is normal for patient.  Patient does not  have any wall motion abnormalities on her echocardiogram.  Her troponins are elevated but are flat and not consistent with unstable angina or cardiac origin.  2.  History of paroxysmal A-fib.  Her troponins could have been up if she was in atrial fibrillation but we have never sought any definitive evidence of A-fib.  3.  PVCs again stable known the patient nothing new.  4.  Hypertension blood pressure is fine  5.  At this point I would discharge patient home she wishes to follow-up with Dr. Alvarez at [facility].  Will defer further testing to him.    Thank you for allowing me to participate in the care of Aleshia Purvis. Feel free to contact me directly with any further questions or concerns.    Raji Garvin MD  [facility]  06/04/24  09:41 EDT

## (undated) DEVICE — GLV SURG PREMIERPRO ORTHO LTX PF SZ8 BRN

## (undated) DEVICE — TOWEL,OR,DSP,ST,BLUE,STD,4/PK,20PK/CS: Brand: MEDLINE

## (undated) DEVICE — 1000 S-DRAPE TOWEL DRAPE 10/BX: Brand: STERI-DRAPE™

## (undated) DEVICE — SKIN PREP TRAY W/CHG: Brand: MEDLINE INDUSTRIES, INC.

## (undated) DEVICE — DRSNG WND GEL FIBR OPTICELL AG PLS W/SLV LF 4X5IN  STRL

## (undated) DEVICE — SUT VIC 0 CT1 36IN J946H

## (undated) DEVICE — DRSNG WND SIL OPTIFOAM GENTLE BRDR ADHS W/SA 4X4IN

## (undated) DEVICE — SOL ISO/ALC RUB 70PCT 4OZ

## (undated) DEVICE — STPLR SKIN VISISTAT WD 35CT

## (undated) DEVICE — DRAPE,REIN 53X77,STERILE: Brand: MEDLINE

## (undated) DEVICE — GLV SURG TRIUMPH ORTHO W/ALOE PF LTX 8 STRL

## (undated) DEVICE — APPL CHLORAPREP HI/LITE 26ML ORNG

## (undated) DEVICE — SUT VIC 3/0 CTI 36IN J944H

## (undated) DEVICE — PK HIP PINNING 40

## (undated) DEVICE — INTENDED FOR TISSUE SEPARATION, AND OTHER PROCEDURES THAT REQUIRE A SHARP SURGICAL BLADE TO PUNCTURE OR CUT.: Brand: BARD-PARKER ® CARBON RIB-BACK BLADES

## (undated) DEVICE — GLV SURG SIGNATURE ESSENTIAL PF LTX SZ8

## (undated) DEVICE — GLV SURG BIOGEL LTX PF 8

## (undated) DEVICE — K-WIRE
Type: IMPLANTABLE DEVICE | Site: FEMUR | Status: NON-FUNCTIONAL
Removed: 2020-04-22

## (undated) DEVICE — OPTIFOAM GENTLE SA, POSTOP, 4X8: Brand: MEDLINE

## (undated) DEVICE — MAT FLR ABSORBENT LG 4FT 10 2.5FT

## (undated) DEVICE — DRILL, AO SMALL: Brand: GAMMA